# Patient Record
Sex: FEMALE | Race: WHITE | NOT HISPANIC OR LATINO | Employment: OTHER | URBAN - METROPOLITAN AREA
[De-identification: names, ages, dates, MRNs, and addresses within clinical notes are randomized per-mention and may not be internally consistent; named-entity substitution may affect disease eponyms.]

---

## 2017-06-23 ENCOUNTER — HISTORICAL (OUTPATIENT)
Dept: HEMATOLOGY/ONCOLOGY | Facility: CLINIC | Age: 67
End: 2017-06-23

## 2017-06-23 LAB
ABS NEUT (OLG): 2.57 X10(3)/MCL (ref 2.1–9.2)
ALBUMIN SERPL-MCNC: 4 GM/DL (ref 3.4–5)
ALBUMIN/GLOB SERPL: 1.5 {RATIO}
ALP SERPL-CCNC: 70 UNIT/L (ref 38–126)
ALT SERPL-CCNC: 18 UNIT/L (ref 12–78)
AST SERPL-CCNC: 9 UNIT/L (ref 15–37)
BASOPHILS # BLD AUTO: 0 X10(3)/MCL (ref 0–0.2)
BASOPHILS NFR BLD AUTO: 0.7 %
BILIRUB SERPL-MCNC: 0.7 MG/DL (ref 0.2–1)
BILIRUBIN DIRECT+TOT PNL SERPL-MCNC: 0.1 MG/DL (ref 0–0.2)
BILIRUBIN DIRECT+TOT PNL SERPL-MCNC: 0.6 MG/DL (ref 0–0.8)
BUN SERPL-MCNC: 15 MG/DL (ref 7–18)
CALCIUM SERPL-MCNC: 8.9 MG/DL (ref 8.5–10.1)
CEA SERPL-MCNC: 1 NG/ML (ref 0–3)
CHLORIDE SERPL-SCNC: 107 MMOL/L (ref 98–107)
CO2 SERPL-SCNC: 25 MMOL/L (ref 21–32)
CREAT SERPL-MCNC: 1.21 MG/DL (ref 0.55–1.02)
EOSINOPHIL # BLD AUTO: 0.4 X10(3)/MCL (ref 0–0.9)
EOSINOPHIL NFR BLD AUTO: 6.2 %
ERYTHROCYTE [DISTWIDTH] IN BLOOD BY AUTOMATED COUNT: 13 % (ref 11.5–17)
GLOBULIN SER-MCNC: 2.6 GM/DL (ref 2.4–3.5)
GLUCOSE SERPL-MCNC: 95 MG/DL (ref 74–106)
HCT VFR BLD AUTO: 44.6 % (ref 37–47)
HGB BLD-MCNC: 14.1 GM/DL (ref 12–16)
LYMPHOCYTES # BLD AUTO: 2.6 X10(3)/MCL (ref 0.6–4.6)
LYMPHOCYTES NFR BLD AUTO: 44.2 %
MCH RBC QN AUTO: 29.7 PG (ref 27–31)
MCHC RBC AUTO-ENTMCNC: 31.6 GM/DL (ref 33–36)
MCV RBC AUTO: 94.1 FL (ref 80–94)
MONOCYTES # BLD AUTO: 0.3 X10(3)/MCL (ref 0.1–1.3)
MONOCYTES NFR BLD AUTO: 5.6 %
NEUTROPHILS # BLD AUTO: 2.6 X10(3)/MCL (ref 2.1–9.2)
NEUTROPHILS NFR BLD AUTO: 43.3 %
PLATELET # BLD AUTO: 347 X10(3)/MCL (ref 130–400)
PMV BLD AUTO: 10.3 FL (ref 9.4–12.4)
POTASSIUM SERPL-SCNC: 4 MMOL/L (ref 3.5–5.1)
PROT SERPL-MCNC: 6.6 GM/DL (ref 6.4–8.2)
RBC # BLD AUTO: 4.74 X10(6)/MCL (ref 4.2–5.4)
SODIUM SERPL-SCNC: 142 MMOL/L (ref 136–145)
WBC # SPEC AUTO: 5.9 X10(3)/MCL (ref 4.5–11.5)

## 2017-06-26 ENCOUNTER — HISTORICAL (OUTPATIENT)
Dept: ANESTHESIOLOGY | Facility: HOSPITAL | Age: 67
End: 2017-06-26

## 2017-12-21 ENCOUNTER — HISTORICAL (OUTPATIENT)
Dept: HEMATOLOGY/ONCOLOGY | Facility: CLINIC | Age: 67
End: 2017-12-21

## 2017-12-21 LAB
ABS NEUT (OLG): 2.5 X10(3)/MCL (ref 2.1–9.2)
ALBUMIN SERPL-MCNC: 3.9 GM/DL (ref 3.4–5)
ALBUMIN/GLOB SERPL: 1.3 {RATIO}
ALP SERPL-CCNC: 71 UNIT/L (ref 38–126)
ALT SERPL-CCNC: 17 UNIT/L (ref 12–78)
AST SERPL-CCNC: 10 UNIT/L (ref 15–37)
BASOPHILS # BLD AUTO: 0 X10(3)/MCL (ref 0–0.2)
BASOPHILS NFR BLD AUTO: 0.9 %
BILIRUB SERPL-MCNC: 0.6 MG/DL (ref 0.2–1)
BILIRUBIN DIRECT+TOT PNL SERPL-MCNC: 0.1 MG/DL (ref 0–0.2)
BILIRUBIN DIRECT+TOT PNL SERPL-MCNC: 0.5 MG/DL (ref 0–0.8)
BUN SERPL-MCNC: 18 MG/DL (ref 7–18)
CALCIUM SERPL-MCNC: 9.8 MG/DL (ref 8.5–10.1)
CEA SERPL-MCNC: 1 NG/ML (ref 0–3)
CHLORIDE SERPL-SCNC: 105 MMOL/L (ref 98–107)
CO2 SERPL-SCNC: 24 MMOL/L (ref 21–32)
CREAT SERPL-MCNC: 1.08 MG/DL (ref 0.55–1.02)
EOSINOPHIL # BLD AUTO: 0.2 X10(3)/MCL (ref 0–0.9)
EOSINOPHIL NFR BLD AUTO: 3.5 %
ERYTHROCYTE [DISTWIDTH] IN BLOOD BY AUTOMATED COUNT: 12.8 % (ref 11.5–17)
GLOBULIN SER-MCNC: 3.1 GM/DL (ref 2.4–3.5)
GLUCOSE SERPL-MCNC: 62 MG/DL (ref 74–106)
HCT VFR BLD AUTO: 43.1 % (ref 37–47)
HGB BLD-MCNC: 13.8 GM/DL (ref 12–16)
LYMPHOCYTES # BLD AUTO: 2.5 X10(3)/MCL (ref 0.6–4.6)
LYMPHOCYTES NFR BLD AUTO: 44.1 %
MCH RBC QN AUTO: 30.2 PG (ref 27–31)
MCHC RBC AUTO-ENTMCNC: 32 GM/DL (ref 33–36)
MCV RBC AUTO: 94.3 FL (ref 80–94)
MONOCYTES # BLD AUTO: 0.4 X10(3)/MCL (ref 0.1–1.3)
MONOCYTES NFR BLD AUTO: 7.4 %
NEUTROPHILS # BLD AUTO: 2.5 X10(3)/MCL (ref 2.1–9.2)
NEUTROPHILS NFR BLD AUTO: 44.1 %
PLATELET # BLD AUTO: 378 X10(3)/MCL (ref 130–400)
PMV BLD AUTO: 9.9 FL (ref 9.4–12.4)
POTASSIUM SERPL-SCNC: 4.3 MMOL/L (ref 3.5–5.1)
PROT SERPL-MCNC: 7 GM/DL (ref 6.4–8.2)
RBC # BLD AUTO: 4.57 X10(6)/MCL (ref 4.2–5.4)
SODIUM SERPL-SCNC: 140 MMOL/L (ref 136–145)
WBC # SPEC AUTO: 5.7 X10(3)/MCL (ref 4.5–11.5)

## 2018-01-12 ENCOUNTER — HISTORICAL (OUTPATIENT)
Dept: RADIOLOGY | Facility: HOSPITAL | Age: 68
End: 2018-01-12

## 2018-12-14 ENCOUNTER — HISTORICAL (OUTPATIENT)
Dept: HEMATOLOGY/ONCOLOGY | Facility: CLINIC | Age: 68
End: 2018-12-14

## 2018-12-14 LAB
ABS NEUT (OLG): 2.23 X10(3)/MCL (ref 2.1–9.2)
ALBUMIN SERPL-MCNC: 4 GM/DL (ref 3.4–5)
ALBUMIN/GLOB SERPL: 1.4 {RATIO}
ALP SERPL-CCNC: 72 UNIT/L (ref 38–126)
ALT SERPL-CCNC: 16 UNIT/L (ref 12–78)
AST SERPL-CCNC: 7 UNIT/L (ref 15–37)
BASOPHILS # BLD AUTO: 0 X10(3)/MCL (ref 0–0.2)
BASOPHILS NFR BLD AUTO: 0.6 %
BILIRUB SERPL-MCNC: 0.7 MG/DL (ref 0.2–1)
BILIRUBIN DIRECT+TOT PNL SERPL-MCNC: 0.1 MG/DL (ref 0–0.2)
BILIRUBIN DIRECT+TOT PNL SERPL-MCNC: 0.6 MG/DL (ref 0–0.8)
BUN SERPL-MCNC: 18 MG/DL (ref 7–18)
CALCIUM SERPL-MCNC: 9 MG/DL (ref 8.5–10.1)
CEA SERPL-MCNC: 0.7 NG/ML (ref 0–3)
CHLORIDE SERPL-SCNC: 109 MMOL/L (ref 98–107)
CO2 SERPL-SCNC: 27 MMOL/L (ref 21–32)
CREAT SERPL-MCNC: 1.21 MG/DL (ref 0.55–1.02)
EOSINOPHIL # BLD AUTO: 0.4 X10(3)/MCL (ref 0–0.9)
EOSINOPHIL NFR BLD AUTO: 7.2 %
ERYTHROCYTE [DISTWIDTH] IN BLOOD BY AUTOMATED COUNT: 12 % (ref 11.5–17)
GLOBULIN SER-MCNC: 2.9 GM/DL (ref 2.4–3.5)
GLUCOSE SERPL-MCNC: 78 MG/DL (ref 74–106)
HCT VFR BLD AUTO: 45.9 % (ref 37–47)
HGB BLD-MCNC: 14.4 GM/DL (ref 12–16)
LYMPHOCYTES # BLD AUTO: 2.1 X10(3)/MCL (ref 0.6–4.6)
LYMPHOCYTES NFR BLD AUTO: 41.8 %
MCH RBC QN AUTO: 29.8 PG (ref 27–31)
MCHC RBC AUTO-ENTMCNC: 31.4 GM/DL (ref 33–36)
MCV RBC AUTO: 95 FL (ref 80–94)
MONOCYTES # BLD AUTO: 0.3 X10(3)/MCL (ref 0.1–1.3)
MONOCYTES NFR BLD AUTO: 5.8 %
NEUTROPHILS # BLD AUTO: 2.2 X10(3)/MCL (ref 2.1–9.2)
NEUTROPHILS NFR BLD AUTO: 44.4 %
PLATELET # BLD AUTO: 383 X10(3)/MCL (ref 130–400)
PMV BLD AUTO: 10.3 FL (ref 9.4–12.4)
POTASSIUM SERPL-SCNC: 4.7 MMOL/L (ref 3.5–5.1)
PROT SERPL-MCNC: 6.9 GM/DL (ref 6.4–8.2)
RBC # BLD AUTO: 4.83 X10(6)/MCL (ref 4.2–5.4)
SODIUM SERPL-SCNC: 143 MMOL/L (ref 136–145)
WBC # SPEC AUTO: 5 X10(3)/MCL (ref 4.5–11.5)

## 2019-11-15 ENCOUNTER — HISTORICAL (OUTPATIENT)
Dept: HEMATOLOGY/ONCOLOGY | Facility: CLINIC | Age: 69
End: 2019-11-15

## 2019-11-15 LAB
ABS NEUT (OLG): 2.1 X10(3)/MCL (ref 2.1–9.2)
ALBUMIN SERPL-MCNC: 3.9 GM/DL (ref 3.4–5)
ALBUMIN/GLOB SERPL: 1.4 {RATIO}
ALP SERPL-CCNC: 70 UNIT/L (ref 38–126)
ALT SERPL-CCNC: 26 UNIT/L (ref 12–78)
AST SERPL-CCNC: 13 UNIT/L (ref 15–37)
BASOPHILS # BLD AUTO: 0 X10(3)/MCL (ref 0–0.2)
BASOPHILS NFR BLD AUTO: 1 %
BILIRUB SERPL-MCNC: 0.7 MG/DL (ref 0.2–1)
BILIRUBIN DIRECT+TOT PNL SERPL-MCNC: 0.1 MG/DL (ref 0–0.2)
BILIRUBIN DIRECT+TOT PNL SERPL-MCNC: 0.6 MG/DL (ref 0–0.8)
BUN SERPL-MCNC: 17 MG/DL (ref 7–18)
CALCIUM SERPL-MCNC: 9.2 MG/DL (ref 8.5–10.1)
CEA SERPL-MCNC: 0.7 NG/ML (ref 0–3)
CHLORIDE SERPL-SCNC: 108 MMOL/L (ref 98–107)
CO2 SERPL-SCNC: 28 MMOL/L (ref 21–32)
CREAT SERPL-MCNC: 1.14 MG/DL (ref 0.55–1.02)
EOSINOPHIL # BLD AUTO: 0.2 X10(3)/MCL (ref 0–0.9)
EOSINOPHIL NFR BLD AUTO: 3.7 %
ERYTHROCYTE [DISTWIDTH] IN BLOOD BY AUTOMATED COUNT: 12.1 % (ref 11.5–17)
GLOBULIN SER-MCNC: 2.7 GM/DL (ref 2.4–3.5)
GLUCOSE SERPL-MCNC: 63 MG/DL (ref 74–106)
HCT VFR BLD AUTO: 45.6 % (ref 37–47)
HGB BLD-MCNC: 14.1 GM/DL (ref 12–16)
LYMPHOCYTES # BLD AUTO: 2.4 X10(3)/MCL (ref 0.6–4.6)
LYMPHOCYTES NFR BLD AUTO: 47.7 %
MCH RBC QN AUTO: 29.9 PG (ref 27–31)
MCHC RBC AUTO-ENTMCNC: 30.9 GM/DL (ref 33–36)
MCV RBC AUTO: 96.6 FL (ref 80–94)
MONOCYTES # BLD AUTO: 0.3 X10(3)/MCL (ref 0.1–1.3)
MONOCYTES NFR BLD AUTO: 5.9 %
NEUTROPHILS # BLD AUTO: 2.1 X10(3)/MCL (ref 2.1–9.2)
NEUTROPHILS NFR BLD AUTO: 41.5 %
PLATELET # BLD AUTO: 400 X10(3)/MCL (ref 130–400)
PMV BLD AUTO: 9.9 FL (ref 9.4–12.4)
POTASSIUM SERPL-SCNC: 4.2 MMOL/L (ref 3.5–5.1)
PROT SERPL-MCNC: 6.6 GM/DL (ref 6.4–8.2)
RBC # BLD AUTO: 4.72 X10(6)/MCL (ref 4.2–5.4)
SODIUM SERPL-SCNC: 140 MMOL/L (ref 136–145)
WBC # SPEC AUTO: 5.1 X10(3)/MCL (ref 4.5–11.5)

## 2019-11-20 ENCOUNTER — HISTORICAL (OUTPATIENT)
Dept: INFUSION THERAPY | Facility: HOSPITAL | Age: 69
End: 2019-11-20

## 2020-11-13 ENCOUNTER — HISTORICAL (OUTPATIENT)
Dept: HEMATOLOGY/ONCOLOGY | Facility: CLINIC | Age: 70
End: 2020-11-13

## 2020-11-13 LAB
ABS NEUT (OLG): 2.31 X10(3)/MCL (ref 2.1–9.2)
ALBUMIN SERPL-MCNC: 4.2 GM/DL (ref 3.4–4.8)
ALBUMIN/GLOB SERPL: 1.6 RATIO (ref 1.1–2)
ALP SERPL-CCNC: 55 UNIT/L (ref 40–150)
ALT SERPL-CCNC: 11 UNIT/L (ref 0–55)
AST SERPL-CCNC: 11 UNIT/L (ref 5–34)
BASOPHILS # BLD AUTO: 0 X10(3)/MCL (ref 0–0.2)
BASOPHILS NFR BLD AUTO: 1 %
BILIRUB SERPL-MCNC: 0.6 MG/DL
BILIRUBIN DIRECT+TOT PNL SERPL-MCNC: 0.2 MG/DL (ref 0–0.5)
BILIRUBIN DIRECT+TOT PNL SERPL-MCNC: 0.4 MG/DL (ref 0–0.8)
BUN SERPL-MCNC: 13.6 MG/DL (ref 9.8–20.1)
CALCIUM SERPL-MCNC: 9.4 MG/DL (ref 8.4–10.2)
CEA SERPL-MCNC: <1.73 NG/ML (ref 0–3)
CHLORIDE SERPL-SCNC: 106 MMOL/L (ref 98–107)
CO2 SERPL-SCNC: 30 MMOL/L (ref 23–31)
CREAT SERPL-MCNC: 1.05 MG/DL (ref 0.55–1.02)
EOSINOPHIL # BLD AUTO: 0.2 X10(3)/MCL (ref 0–0.9)
EOSINOPHIL NFR BLD AUTO: 3.5 %
ERYTHROCYTE [DISTWIDTH] IN BLOOD BY AUTOMATED COUNT: 11.7 % (ref 11.5–17)
GLOBULIN SER-MCNC: 2.6 GM/DL (ref 2.4–3.5)
GLUCOSE SERPL-MCNC: 81 MG/DL (ref 82–115)
HCT VFR BLD AUTO: 43.1 % (ref 37–47)
HGB BLD-MCNC: 13.7 GM/DL (ref 12–16)
LYMPHOCYTES # BLD AUTO: 2.3 X10(3)/MCL (ref 0.6–4.6)
LYMPHOCYTES NFR BLD AUTO: 44.2 %
MCH RBC QN AUTO: 31.1 PG (ref 27–31)
MCHC RBC AUTO-ENTMCNC: 31.8 GM/DL (ref 33–36)
MCV RBC AUTO: 98 FL (ref 80–94)
MONOCYTES # BLD AUTO: 0.3 X10(3)/MCL (ref 0.1–1.3)
MONOCYTES NFR BLD AUTO: 6.4 %
NEUTROPHILS # BLD AUTO: 2.3 X10(3)/MCL (ref 2.1–9.2)
NEUTROPHILS NFR BLD AUTO: 44.7 %
PLATELET # BLD AUTO: 379 X10(3)/MCL (ref 130–400)
PMV BLD AUTO: 9.6 FL (ref 9.4–12.4)
POTASSIUM SERPL-SCNC: 5.3 MMOL/L (ref 3.5–5.1)
PROT SERPL-MCNC: 6.8 GM/DL (ref 5.8–7.6)
RBC # BLD AUTO: 4.4 X10(6)/MCL (ref 4.2–5.4)
SODIUM SERPL-SCNC: 145 MMOL/L (ref 136–145)
WBC # SPEC AUTO: 5.2 X10(3)/MCL (ref 4.5–11.5)

## 2021-11-04 ENCOUNTER — HISTORICAL (OUTPATIENT)
Dept: HEMATOLOGY/ONCOLOGY | Facility: CLINIC | Age: 71
End: 2021-11-04

## 2021-11-04 LAB
ABS NEUT (OLG): 2.31 X10(3)/MCL (ref 2.1–9.2)
ALBUMIN SERPL-MCNC: 4.2 GM/DL (ref 3.4–4.8)
ALBUMIN/GLOB SERPL: 1.8 RATIO (ref 1.1–2)
ALP SERPL-CCNC: 60 UNIT/L (ref 40–150)
ALT SERPL-CCNC: 12 UNIT/L (ref 0–55)
AST SERPL-CCNC: 12 UNIT/L (ref 5–34)
BASOPHILS # BLD AUTO: 0 X10(3)/MCL (ref 0–0.2)
BASOPHILS NFR BLD AUTO: 0.9 %
BILIRUB SERPL-MCNC: 0.5 MG/DL
BILIRUBIN DIRECT+TOT PNL SERPL-MCNC: 0.2 MG/DL (ref 0–0.5)
BILIRUBIN DIRECT+TOT PNL SERPL-MCNC: 0.3 MG/DL (ref 0–0.8)
BUN SERPL-MCNC: 14.1 MG/DL (ref 9.8–20.1)
CALCIUM SERPL-MCNC: 9.9 MG/DL (ref 8.7–10.5)
CEA SERPL-MCNC: <1.73 NG/ML (ref 0–3)
CHLORIDE SERPL-SCNC: 106 MMOL/L (ref 98–107)
CO2 SERPL-SCNC: 24 MMOL/L (ref 23–31)
CREAT SERPL-MCNC: 0.91 MG/DL (ref 0.55–1.02)
EOSINOPHIL # BLD AUTO: 0.4 X10(3)/MCL (ref 0–0.9)
EOSINOPHIL NFR BLD AUTO: 6.9 %
ERYTHROCYTE [DISTWIDTH] IN BLOOD BY AUTOMATED COUNT: 12.3 % (ref 11.5–17)
GLOBULIN SER-MCNC: 2.4 GM/DL (ref 2.4–3.5)
GLUCOSE SERPL-MCNC: 92 MG/DL (ref 82–115)
HCT VFR BLD AUTO: 44.8 % (ref 37–47)
HGB BLD-MCNC: 14.1 GM/DL (ref 12–16)
LYMPHOCYTES # BLD AUTO: 2.2 X10(3)/MCL (ref 0.6–4.6)
LYMPHOCYTES NFR BLD AUTO: 41.7 %
MCH RBC QN AUTO: 30.4 PG (ref 27–31)
MCHC RBC AUTO-ENTMCNC: 31.5 GM/DL (ref 33–36)
MCV RBC AUTO: 96.6 FL (ref 80–94)
MONOCYTES # BLD AUTO: 0.4 X10(3)/MCL (ref 0.1–1.3)
MONOCYTES NFR BLD AUTO: 7.1 %
NEUTROPHILS # BLD AUTO: 2.3 X10(3)/MCL (ref 2.1–9.2)
NEUTROPHILS NFR BLD AUTO: 43.2 %
PLATELET # BLD AUTO: 377 X10(3)/MCL (ref 130–400)
PMV BLD AUTO: 10 FL (ref 9.4–12.4)
POTASSIUM SERPL-SCNC: 4.1 MMOL/L (ref 3.5–5.1)
PROT SERPL-MCNC: 6.6 GM/DL (ref 5.8–7.6)
RBC # BLD AUTO: 4.64 X10(6)/MCL (ref 4.2–5.4)
SODIUM SERPL-SCNC: 142 MMOL/L (ref 136–145)
WBC # SPEC AUTO: 5.4 X10(3)/MCL (ref 4.5–11.5)

## 2022-01-24 ENCOUNTER — HISTORICAL (OUTPATIENT)
Dept: RADIOLOGY | Facility: HOSPITAL | Age: 72
End: 2022-01-24

## 2022-04-30 NOTE — PROGRESS NOTES
Patient:   Elida Pate            MRN: [id number]            FIN: [id number]               Age:   69 years     Sex:  Female     :  1950   Associated Diagnoses:   None   Author:   Clement ELLSWORTH, Shahana KERR      Visit Information   Diagnosis: Stage IIA right breast cancer (T2 N0 M0), 2.5 cm, grade III, ER/MA negative, HER-2 +,                          high risk Oncotype score of 75        Chronic back pain    Current Treatment: Observation  Treatment History: [facility] protocol BIG-4-11: TCH + study drug x 6 ---> maintenance Herceptin + study drug x 1 yr    Clinical History:  Patient initially presented  with a palpable mass in the right breast. History of multiple previous breast biopsies which were negative. The mass persisted and a subsequent mammogram was abnormal. Biopsy was positive for infiltrating ductal carcinoma. She underwent right modified radical mastectomy 12. Final pathology showed a 2.5 cm grade 3 ductal carcinoma with clear margins and 10 negative lymph nodes. There was no evidence of lymphovascular invasion. Estrogen receptors +2%, progesterone receptors <1% and HER-2/kenyetta was overexpressed by fish. Ki-67 was high at 64%. Oncotype testing showed a high risk for recurrence score of 75. Estrogen and progesterone receptors were negative on Oncotype testing. Adjuvant chemotherapy was recommended with a trastuzamab-based regimen. She was initially evaluated by Dr. Christensen and enrolled on protocol to receive carboplatin, Taxotere and Herceptin +/- Perjeta. I assumed her care just prior to her sixth cycle of chemotherapy at her request. She had significant difficulty tolerating her adjuvant therapy. She had significant nausea, myalgias, fatigue, diarrhea and lower extremity edema. Taxotere was discontinued and she was changed to Taxol beginning with her third cycle which was tolerated marginally better. She developed grade 2 neuropathy associated with Taxol. She completed 1 year of  maintenance therapy with Herceptin in combination with the study drug 9/27/13. Echocardiogram at the completion of therapy 9/20/13 showed preserved left ventricular systolic function with an ejection fraction of 60%.    She presented to the hospital with a tender right inguinal mass 10/7/13. Physical exam was compatible with a reducible, partially strangulated inguinal hernia. CT scan of the abdomen and pelvis showed a right inguinal Baldwin's hernia with ischemic change of the hernia sac and intra-abdominal small bowel. She underwent right hernia repair with mesh 10/10/13. She was discharged home uneventfully. She underwent prophylactic left mastectomy and placement of bilateral tissue expanders 11/25/13. Left breast showed no significant pathology. She had to undergo additional surgery 12/27/13 due to recurrence of her right inguinal hernia. Her Mediport was removed.    Surveillance CT scans of the chest and abdomen 3/11/14 showed a 6 mm nonspecific ground-glass nodule in the left lower lobe there was not clearly seen on the previous CT PET scan. There were no findings to indicate metastatic disease. Whole body bone scan was negative. Followup CT of the chest 6/3/14 showed resolution of the left lower lobe nodule and no new findings.  She completed her reconstruction therapy 12/29/14 with removal of the tissue expanders and placement of bilateral implants.  No additional surgery or nipple reconstruction is planned.  She reports no changes on her self breast examination.  Laboratory testing is unremarkable including serum tumor markers.  Follow-up echocardiogram 2/7/15 showed normal left ventricular function with an ejection fraction of 60%.  She has done well without evidence of disease recurrence.    She was seen 1/14/16 for routine breast cancer surveillance.  She had no evidence of disease recurrence and her laboratory was unremarkable.  She reported acute back pain involving the thoracic spine worse when flat  and not improved with Tylenol or Motrin.  She was referred for an x-ray of the thoracic spine 2/5/16, which revealed moderate diffuse osteopenia, mild thoracic kyphosis, and mild degenerative change of the thoracic spine.  There were no osseous lytic or blastic lesions.  Due to persistent symptoms, she underwent an MRI of the thoracic spine 4/18/16.  There is a small altered signal at the left pedicle of T6 of undetermined significance with no corresponding lytic or sclerotic lesions.  There was also abnormal signal at T9 left vertebral body felt consistent with an atypical hemangioma.  She had other degenerative changes and disc bulge at T9-10 causing indentation of the thecal sac.      Chief Complaint   No problems      Interval History   Patient is here today by herself for breast cancer surveillance appointment.  She feels well.  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  She is now over 7 years out from her diagnosis and surgery.  She has done well with no evidence of disease recurrence.  She has no complaints today.  She has ongoing peripheral neuropathy for which she takes gabapentin daily.  Her symptoms remain stable.  She denies any changes to her self breast exam.  Laboratory performed for this visit including LFTs and tumor markers were entirely normal and reviewed today with the patient.  She is requesting a flu vaccine.      Review of Systems   Constitutional:  No fever, No chills, No sweats, No fatigue.    Eye:  No icterus, No double vision, No visual disturbances.    Ear/Nose/Mouth/Throat:  No nasal congestion, No nasal discharge, No sore throat.    Respiratory:  No shortness of breath, No cough, No sputum production, No hemoptysis, No wheezing.    Cardiovascular:  No chest pain, No palpitations, No tachycardia.    Breast:  No changes on self breast examination.    Gastrointestinal:  No nausea, No vomiting, No diarrhea, No constipation, No heartburn, No abdominal pain.    Genitourinary:   No dysuria, No hematuria, No change in urine stream.    Hematology/Lymphatics:  Bruising tendency, No bleeding tendency, No swollen lymph glands.    Musculoskeletal:  Joint pain, Chronic back pain - improved, No claudication.    Integumentary:  No rash, No dryness, No skin lesion.    Neurologic:  Alert and oriented X4, Grade 2 sensory neuropathy of feet > hands, No abnormal balance, No confusion.    Psychiatric:  Poor short-term memory, No anxiety, No depression.       Health Status   Allergies:    Allergic Reactions (Selected)  Severity Not Documented  Ambien- No reactions were documented.  Morphine- No reactions were documented.  Neosporin Ointment- No reactions were documented.,    Allergies (3) Active Reaction  Ambien None Documented  morphine None Documented  Neosporin Ointment None Documented     Current medications:  (Selected)   Outpatient Medications  Future  FLU VACCINE for IM inj. (Pt. > 36 mo): 0.5 mL, form: Injection, IM, Once, *Est. first dose 11/20/19 11:33:00 CST, *Est. stop date 11/20/19 11:33:00 CST, NOW, Future Order, Waste Code [facility]  Prescriptions  Prescribed  Neurontin 300 mg oral capsule: 300 mg = 1 cap(s), Oral, BID, # 60 cap(s), 6 Refill(s), Pharmacy: [id number]  Valtrex 500 mg oral tablet: 500 mg = 1 tab(s), Oral, BID, PRN PRN Fever blisters, # 30 tab(s), 5 Refill(s), Pharmacy: [id number]      Histories   Past Medical History:    Active  Breast cancer (497136390)  High cholesterol (177536009)  Resolved  POLY - Total abdominal hysterectomy (940892809):  Resolved.  Hypercholesterolemia (54204083):  Resolved.  Ocular shinges (9157181):  Resolved.   Family History:    Primary malignant neoplasm of breast  Other  Comments:  10/21/2013 17:46 ABBIT - Anitra Franco  Paternal aunt had lobular breast cancer and paternal great aunt had breast cancer  Primary malignant neoplasm of lung  Mother  Mother  Squamous cell carcinoma of leg...  Father     Procedure history:    Hernia  Repair Inguinal Laparoscopic (Right) on 10/10/2013 at 63 Years.  Comments:  10/10/2013 13:01 ELIS Zimmerman RN , Eladia BURGOS  auto-populated from documented surgical case  mediport placement.  Right breast masectomy.  hernia repair.  Hysterectomy at age 40.  Bilateral breast biopsies.  Right mastectomy in 8/12.  Tissue expanders placed right breast.   Social History        Social & Psychosocial Habits    Alcohol  10/05/2012 Risk Assessment: Denies Alcohol Use    04/06/2016  Use: Never    Substance Use  12/09/2012 Risk Assessment: Denies Substance Abuse    12/28/2016  Use: Never    Tobacco  10/05/2012 Risk Assessment: Denies Tobacco Use    04/06/2016  Use: Never smoker    12/20/2018  Use: Never smoker    Patient Wants Consult For Cessation Counseling N/A  .     Tobacco: Nonsmoker, no significant alcohol use; Works as an , lives in [place].        Physical Examination   Vital Signs   11/20/2019 10:44 CST     Temperature Oral          36.8 DegC                             Temperature Oral (calculated)             98.24 DegF                             Peripheral Pulse Rate     75 bpm                             Systolic Blood Pressure   128 mmHg                             Diastolic Blood Pressure  82 mmHg     Measurements from flowsheet : Measurements   11/20/2019 10:44 CST     Weight Dosing             86.7 kg                             Weight Measured           86.7 kg                             Weight Measured and Calculated in Lbs     191.14 lb                             Height/Length Dosing      170.18 cm                             Height/Length Measured    170.18 cm                             BSA Measured              2.02 m2                             Body Mass Index Measured  29.94 kg/m2     General:  Alert and oriented, Well-developed white female in no acute distress.    Eye:  Pupils are equal, round and reactive to light, Extraocular movements are intact, Normal conjunctiva,  Vision unchanged, Sclera nonicteric.    HENT:  Normocephalic, Oral mucosa is moist, No pharyngeal erythema.    Neck:  Supple, Non-tender, No lymphadenopathy.    Respiratory:  Lungs are clear to auscultation, Respirations are non-labored, Breath sounds are equal.    Cardiovascular:  Normal rate, Regular rhythm, No murmur, Mediport removal scar left upper chest.    Breast:  Bilateral mastectomies with implant reconstruction.  No palpable masses, skin changes or axillary nodes bilaterally.    Gastrointestinal:  Soft, Non-tender, Non-distended, Normal bowel sounds, No organomegaly, Well-healed right inguinal incision.    Lymphatics:  No lymphadenopathy neck, axilla, groin.    Musculoskeletal:  Normal range of motion, No tenderness, No lower extremity edema.    Integumentary:  Warm, Dry, No rash.    Neurologic:  Alert, Oriented, Normal motor function, Cranial Nerves II-XII are grossly intact, + Chronic sensory neuropathy.    ECOG Performance Scale: 1- Strenuous physical activity restricted; fully ambulatory and able to carry out light work.      Review / Management   Laboratory Results   Last 10 Days Lab Results : PowerNote Discrete Results   11/15/2019 11:10 CST     Sodium Lvl                140 mmol/L                             Potassium Lvl             4.2 mmol/L                             Chloride                  108 mmol/L  HI                             CO2                       28.0 mmol/L                             Calcium Lvl               9.2 mg/dL                             Glucose Lvl               63 mg/dL  LOW                             BUN                       17.0 mg/dL                             Creatinine                1.14 mg/dL  HI                             eGFR-AA                   >60 mL/min/1.73 m2  NA                             eGFR-CHMEA                  50 mL/min/1.73 m2  NA                             Bili Total                0.7 mg/dL                             Bili Direct                0.10 mg/dL                             Bili Indirect             0.60 mg/dL                             AST                       13 unit/L  LOW                             ALT                       26 unit/L                             Alk Phos                  70 unit/L                             Total Protein             6.6 gm/dL                             Albumin Lvl               3.90 gm/dL                             Globulin                  2.70 gm/dL                             A/G Ratio                 1.4  NA                             CEA                       0.7 ng/mL                             CA 27.29                  25.9 unit/mL    11/15/2019 11:08 CST     WBC                       5.1 x10(3)/mcL                             RBC                       4.72 x10(6)/mcL                             Hgb                       14.1 gm/dL                             Hct                       45.6 %                             Platelet                  400 x10(3)/mcL                             MCV                       96.6 fL  HI                             MCH                       29.9 pg                             MCHC                      30.9 gm/dL  LOW                             RDW                       12.1 %                             MPV                       9.9 fL                             Abs Neut                  2.10 x10(3)/mcL                             NEUT%                     41.5 %  NA                             NEUT#                     2.1 x10(3)/mcL                             LYMPH%                    47.7 %  NA                             LYMPH#                    2.4 x10(3)/mcL                             MONO%                     5.9 %  NA                             MONO#                     0.3 x10(3)/mcL                             EOS%                      3.7 %  NA                             EOS#                      0.2 x10(3)/mcL                              BASO%                     1.0 %  NA                             BASO#                     0.0 x10(3)/mcL           Impression and Plan   IMPRESSION:  Stage IIA breast  cancer - MOLLY   Chronic neuropathy involving lower extremities    PLAN:  Patient has no evidence of disease recurrence.  She will return in 1 year for ongoing breast cancer surveillance.  Flu vaccine today.    MU ELDRIDGE, FNP-C    Other Physicians   Dr. ALVARO Lyles

## 2022-07-18 DIAGNOSIS — K40.90 UNILATERAL INGUINAL HERNIA WITHOUT OBSTRUCTION OR GANGRENE: Primary | ICD-10-CM

## 2022-07-20 ENCOUNTER — HOSPITAL ENCOUNTER (OUTPATIENT)
Dept: RADIOLOGY | Facility: HOSPITAL | Age: 72
Discharge: HOME | End: 2022-07-20
Attending: SURGERY
Payer: MEDICARE

## 2022-07-20 DIAGNOSIS — K40.90 UNILATERAL INGUINAL HERNIA WITHOUT OBSTRUCTION OR GANGRENE: ICD-10-CM

## 2022-07-20 LAB
CREAT SERPL-MCNC: 1.2 MG/DL (ref 0.5–1.4)
SAMPLE: NORMAL

## 2022-07-20 PROCEDURE — 25500020 PHARM REV CODE 255: Performed by: SURGERY

## 2022-07-20 PROCEDURE — 72193 CT PELVIS W/DYE: CPT | Mod: TC

## 2022-07-20 RX ADMIN — IOPAMIDOL 100 ML: 755 INJECTION, SOLUTION INTRAVENOUS at 09:07

## 2022-10-27 DIAGNOSIS — C50.411 MALIGNANT NEOPLASM OF UPPER-OUTER QUADRANT OF RIGHT FEMALE BREAST, UNSPECIFIED ESTROGEN RECEPTOR STATUS: Primary | ICD-10-CM

## 2022-10-28 ENCOUNTER — LAB VISIT (OUTPATIENT)
Dept: LAB | Facility: HOSPITAL | Age: 72
End: 2022-10-28
Attending: PHYSICIAN ASSISTANT
Payer: MEDICARE

## 2022-10-28 DIAGNOSIS — R79.1 ABNORMAL COAGULATION PROFILE: ICD-10-CM

## 2022-10-28 DIAGNOSIS — C50.411 MALIGNANT NEOPLASM OF UPPER-OUTER QUADRANT OF RIGHT FEMALE BREAST, UNSPECIFIED ESTROGEN RECEPTOR STATUS: ICD-10-CM

## 2022-10-28 DIAGNOSIS — M99.04 SOMATIC DYSFUNCTION OF SACRAL REGION: Primary | ICD-10-CM

## 2022-10-28 DIAGNOSIS — Z01.818 OTHER SPECIFIED PRE-OPERATIVE EXAMINATION: ICD-10-CM

## 2022-10-28 DIAGNOSIS — R94.5 ABNORMAL RESULTS OF LIVER FUNCTION STUDIES: ICD-10-CM

## 2022-10-28 LAB
ALBUMIN SERPL-MCNC: 4.4 GM/DL (ref 3.4–4.8)
ALBUMIN/GLOB SERPL: 1.5 RATIO (ref 1.1–2)
ALP SERPL-CCNC: 66 UNIT/L (ref 40–150)
ALT SERPL-CCNC: 18 UNIT/L (ref 0–55)
APTT PPP: 25.9 SECONDS (ref 23.2–33.7)
AST SERPL-CCNC: 17 UNIT/L (ref 5–34)
BASOPHILS # BLD AUTO: 0.06 X10(3)/MCL (ref 0–0.2)
BASOPHILS NFR BLD AUTO: 1.1 %
BILIRUBIN DIRECT+TOT PNL SERPL-MCNC: 0.6 MG/DL
BUN SERPL-MCNC: 25.4 MG/DL (ref 9.8–20.1)
CALCIUM SERPL-MCNC: 10 MG/DL (ref 8.4–10.2)
CEA SERPL-MCNC: <1.73 NG/ML (ref 0–3)
CHLORIDE SERPL-SCNC: 108 MMOL/L (ref 98–107)
CO2 SERPL-SCNC: 23 MMOL/L (ref 23–31)
CREAT SERPL-MCNC: 1.23 MG/DL (ref 0.55–1.02)
EOSINOPHIL # BLD AUTO: 0.15 X10(3)/MCL (ref 0–0.9)
EOSINOPHIL NFR BLD AUTO: 2.7 %
ERYTHROCYTE [DISTWIDTH] IN BLOOD BY AUTOMATED COUNT: 12.2 % (ref 11.5–17)
GFR SERPLBLD CREATININE-BSD FMLA CKD-EPI: 47 MLS/MIN/1.73/M2
GLOBULIN SER-MCNC: 2.9 GM/DL (ref 2.4–3.5)
GLUCOSE SERPL-MCNC: 87 MG/DL (ref 82–115)
HCT VFR BLD AUTO: 46.3 % (ref 37–47)
HGB BLD-MCNC: 14.3 GM/DL (ref 12–16)
IMM GRANULOCYTES # BLD AUTO: 0.01 X10(3)/MCL (ref 0–0.04)
IMM GRANULOCYTES NFR BLD AUTO: 0.2 %
INR BLD: 1.01 (ref 0–1.3)
LYMPHOCYTES # BLD AUTO: 2.65 X10(3)/MCL (ref 0.6–4.6)
LYMPHOCYTES NFR BLD AUTO: 47.6 %
MCH RBC QN AUTO: 29.4 PG (ref 27–31)
MCHC RBC AUTO-ENTMCNC: 30.9 MG/DL (ref 33–36)
MCV RBC AUTO: 95.1 FL (ref 80–94)
MONOCYTES # BLD AUTO: 0.36 X10(3)/MCL (ref 0.1–1.3)
MONOCYTES NFR BLD AUTO: 6.5 %
NEUTROPHILS # BLD AUTO: 2.3 X10(3)/MCL (ref 2.1–9.2)
NEUTROPHILS NFR BLD AUTO: 41.9 %
PLATELET # BLD AUTO: 453 X10(3)/MCL (ref 130–400)
PMV BLD AUTO: 10 FL (ref 7.4–10.4)
POTASSIUM SERPL-SCNC: 5.3 MMOL/L (ref 3.5–5.1)
PROT SERPL-MCNC: 7.3 GM/DL (ref 5.8–7.6)
PROTHROMBIN TIME: 10.2 SECONDS (ref 12.5–14.5)
RBC # BLD AUTO: 4.87 X10(6)/MCL (ref 4.2–5.4)
SODIUM SERPL-SCNC: 142 MMOL/L (ref 136–145)
WBC # SPEC AUTO: 5.6 X10(3)/MCL (ref 4.5–11.5)

## 2022-10-28 PROCEDURE — 86300 IMMUNOASSAY TUMOR CA 15-3: CPT

## 2022-10-28 PROCEDURE — 85610 PROTHROMBIN TIME: CPT

## 2022-10-28 PROCEDURE — 36415 COLL VENOUS BLD VENIPUNCTURE: CPT

## 2022-10-28 PROCEDURE — 82378 CARCINOEMBRYONIC ANTIGEN: CPT

## 2022-10-28 PROCEDURE — 85025 COMPLETE CBC W/AUTO DIFF WBC: CPT

## 2022-10-28 PROCEDURE — 85730 THROMBOPLASTIN TIME PARTIAL: CPT

## 2022-10-28 PROCEDURE — 80053 COMPREHEN METABOLIC PANEL: CPT

## 2022-10-31 LAB — CANCER AG27-29 SERPL-ACNC: 16.8 U/ML

## 2022-11-16 RX ORDER — AMLODIPINE BESYLATE 5 MG/1
5 TABLET ORAL DAILY
COMMUNITY
Start: 2022-10-24

## 2022-11-16 RX ORDER — GABAPENTIN 300 MG/1
CAPSULE ORAL
COMMUNITY
Start: 2022-09-01 | End: 2022-11-28 | Stop reason: SDUPTHER

## 2022-11-17 NOTE — PROGRESS NOTES
Subjective:       Patient ID: Elida Pate is a 72 y.o. female.    Chief Complaint:  I just had surgery for my back    Diagnosis: Stage IIA right breast cancer 8/12 (T2 N0 M0), 2.5 cm, grade III, ER/FL negative, HER-2 +,                          Oncotype RS 75                    S/p bilateral mastectomies                    + COVID-19 vaccinated    Treatment History  [facility] protocol BIG-4-11: TCH + study drug x 6 ---> maintenance Herceptin + study drug x 1 yr    Current Treatment: Observation    Clinical History:  Patient initially presented 2/12 with a palpable mass in the right breast. History of multiple previous breast biopsies which were negative. The mass persisted and a subsequent mammogram was abnormal. Biopsy was positive for infiltrating ductal carcinoma. She underwent right modified radical mastectomy 8/17/12. Final pathology showed a 2.5 cm grade 3 ductal carcinoma with clear margins and 10 negative lymph nodes. There was no evidence of lymphovascular invasion. Estrogen receptors +2%, progesterone receptors <1% and HER-2/kenyetta was overexpressed by FISH. Ki-67 was high at 64%. Oncotype recurrence score 75. Estrogen and progesterone receptors were negative on Oncotype testing. Adjuvant chemotherapy was recommended with a trastuzamab-based regimen. She was initially evaluated by Dr. Christensen and enrolled on protocol to receive carboplatin, Taxotere and Herceptin +/- Perjeta. I assumed her care just prior to her sixth cycle of chemotherapy at her request. She had significant difficulty tolerating her adjuvant therapy. She had significant nausea, myalgias, fatigue, diarrhea and lower extremity edema. Taxotere was discontinued and she was changed to Taxol beginning with her third cycle which was tolerated marginally better. She developed grade 2 neuropathy associated with Taxol. She completed 1 year of maintenance therapy with Herceptin in combination with the study drug 9/27/13. Echocardiogram at the  completion of therapy 9/20/13 showed preserved left ventricular systolic function with an ejection fraction of 60%.    She underwent resection of an incarcerated right inguinal hernia 10/10/13. She underwent prophylactic left mastectomy and placement of bilateral tissue expanders 11/25/13. Left breast showed no significant pathology. She had to undergo additional surgery 12/27/13 due to recurrence of her right inguinal hernia. Her Mediport was removed.  She underwent placement of bilateral breast implants 12/29/14.  Follow-up echocardiogram 2/7/15 showed normal left ventricular function with an ejection fraction of 60%.  She has done well without evidence of disease recurrence.    Interval History  She returns to the office today for an annual surveillance visit accompanied by her .  She is in a wheelchair after undergoing surgery for an S1 fusion 11/16/22.  She reports no other significant health issues.  She has no changes on her self examination following bilateral mastectomies and reconstruction.  Laboratory testing for her visit was unremarkable.    Review of Systems   Constitutional:  Positive for activity change. Negative for appetite change, fatigue and fever.   HENT:  Negative for mouth sores, sore throat and trouble swallowing.    Eyes: Negative.    Respiratory:  Negative for cough, chest tightness and shortness of breath.    Cardiovascular:  Negative for chest pain, palpitations and leg swelling.   Gastrointestinal:  Negative for abdominal distention, abdominal pain, blood in stool, change in bowel habit, constipation, diarrhea, nausea, vomiting and change in bowel habit.   Genitourinary:  Negative for dysuria, frequency and urgency.   Musculoskeletal:  Positive for arthralgias and back pain.   Integumentary:  Negative for rash and mole/lesion.   Neurological:  Positive for numbness (Feet > hands). Negative for dizziness, weakness and headaches.   Hematological:  Negative for adenopathy. Does not  "bruise/bleed easily.   Psychiatric/Behavioral: Negative.         PMHx:  HTN, athritis, neuropathy  PSHx:  Hysterectomy, right mastectomy and reconstruction, MediPort placement and removal, right inguinal hernia repair, S1 fusion  SH:  Lifetime nonsmoker, no significant alcohol use.  Lives in [place] with her , works as an  at .  FH:  Her mother had lung cancer, father had a metastatic squamous cell carcinoma of the leg.  A paternal aunt had breast cancer.    Objective:        /70 (BP Location: Left arm)   Pulse 68   Temp 98.6 °F (37 °C)   Ht 5' 7.01" (1.702 m)   Wt 87.6 kg (193 lb 2 oz)   BMI 30.24 kg/m²    Physical Exam  Constitutional:       Comments: Elderly, well-developed white female in a wheelchair in NAD   HENT:      Head: Normocephalic.      Mouth/Throat:      Mouth: Mucous membranes are moist.      Pharynx: Oropharynx is clear. No posterior oropharyngeal erythema.   Eyes:      Extraocular Movements: Extraocular movements intact.      Conjunctiva/sclera: Conjunctivae normal.      Pupils: Pupils are equal, round, and reactive to light.   Cardiovascular:      Rate and Rhythm: Normal rate and regular rhythm.      Heart sounds: No murmur heard.  Pulmonary:      Comments: Lungs clear to auscultation  Chest:      Comments: Bilateral mastectomies with implant reconstruction.  No suspicious masses, skin changes or axillary nodes bilaterally.  Abdominal:      General: Bowel sounds are normal. There is no distension.      Palpations: Abdomen is soft. There is no mass.      Tenderness: There is no abdominal tenderness.   Musculoskeletal:         General: No swelling or tenderness. Normal range of motion.      Cervical back: Neck supple. No tenderness.   Lymphadenopathy:      Cervical: No cervical adenopathy.      Comments: Well-healed right inguinal incision.   Skin:     General: Skin is warm and dry.      Findings: No rash.   Neurological:      Mental Status: She is " alert and oriented to person, place, and time. Mental status is at baseline.      Cranial Nerves: No cranial nerve deficit.      Sensory: Sensory deficit present.     ECOG SCORE    1 - Restricted in strenuous activity-ambulatory and able to carry out work of a light nature        LABORATORY  No results found for this or any previous visit (from the past 168 hour(s)).       Laboratory from 10/28/22 reviewed and unremarkable.    Assessment:   Stage IIA breast  cancer - MOLLY   Chronic neuropathy involving lower extremities - stable  S/p S1 fusion 11/16/22      Plan:   Patient has no clinical or laboratory findings suspicious for recurrence of her breast cancer.    She has no changes on her clinical exam.  RTC in 1 year for a follow-up visit and clinical exam.      MARCOS DUKE MD    Other Physicians  Dr. Paco Schmidt

## 2022-11-21 ENCOUNTER — OFFICE VISIT (OUTPATIENT)
Dept: HEMATOLOGY/ONCOLOGY | Facility: CLINIC | Age: 72
End: 2022-11-21
Payer: MEDICARE

## 2022-11-21 VITALS
WEIGHT: 193.13 LBS | SYSTOLIC BLOOD PRESSURE: 110 MMHG | HEIGHT: 67 IN | HEART RATE: 68 BPM | BODY MASS INDEX: 30.31 KG/M2 | TEMPERATURE: 99 F | DIASTOLIC BLOOD PRESSURE: 70 MMHG

## 2022-11-21 DIAGNOSIS — C50.411 MALIGNANT NEOPLASM OF UPPER-OUTER QUADRANT OF RIGHT BREAST IN FEMALE, ESTROGEN RECEPTOR NEGATIVE: Primary | ICD-10-CM

## 2022-11-21 DIAGNOSIS — Z17.1 MALIGNANT NEOPLASM OF UPPER-OUTER QUADRANT OF RIGHT BREAST IN FEMALE, ESTROGEN RECEPTOR NEGATIVE: Primary | ICD-10-CM

## 2022-11-21 PROCEDURE — 99999 PR PBB SHADOW E&M-EST. PATIENT-LVL III: ICD-10-PCS | Mod: PBBFAC,,, | Performed by: INTERNAL MEDICINE

## 2022-11-21 PROCEDURE — 99999 PR PBB SHADOW E&M-EST. PATIENT-LVL III: CPT | Mod: PBBFAC,,, | Performed by: INTERNAL MEDICINE

## 2022-11-21 PROCEDURE — 99214 OFFICE O/P EST MOD 30 MIN: CPT | Mod: S$PBB,,, | Performed by: INTERNAL MEDICINE

## 2022-11-21 PROCEDURE — 99213 OFFICE O/P EST LOW 20 MIN: CPT | Mod: PBBFAC | Performed by: INTERNAL MEDICINE

## 2022-11-21 PROCEDURE — 99214 PR OFFICE/OUTPT VISIT, EST, LEVL IV, 30-39 MIN: ICD-10-PCS | Mod: S$PBB,,, | Performed by: INTERNAL MEDICINE

## 2022-11-21 RX ORDER — TRAMADOL HYDROCHLORIDE 50 MG/1
50 TABLET ORAL EVERY 4 HOURS PRN
COMMUNITY
Start: 2022-11-16

## 2022-11-21 RX ORDER — CEPHALEXIN 500 MG/1
500 CAPSULE ORAL 4 TIMES DAILY
COMMUNITY
Start: 2022-11-16 | End: 2023-11-21 | Stop reason: ALTCHOICE

## 2022-11-28 DIAGNOSIS — G62.0 PERIPHERAL NEUROPATHY DUE TO CHEMOTHERAPY: Primary | ICD-10-CM

## 2022-11-28 DIAGNOSIS — T45.1X5A PERIPHERAL NEUROPATHY DUE TO CHEMOTHERAPY: Primary | ICD-10-CM

## 2022-11-28 RX ORDER — GABAPENTIN 300 MG/1
300 CAPSULE ORAL 2 TIMES DAILY
Qty: 60 CAPSULE | Refills: 5 | Status: SHIPPED | OUTPATIENT
Start: 2022-11-28 | End: 2023-05-30 | Stop reason: SDUPTHER

## 2023-01-03 ENCOUNTER — LAB VISIT (OUTPATIENT)
Dept: LAB | Facility: HOSPITAL | Age: 73
End: 2023-01-03
Attending: FAMILY MEDICINE
Payer: MEDICARE

## 2023-01-03 DIAGNOSIS — I10 ESSENTIAL HYPERTENSION, MALIGNANT: Primary | ICD-10-CM

## 2023-01-03 DIAGNOSIS — E05.90 PRETIBIAL MYXEDEMA: ICD-10-CM

## 2023-01-03 DIAGNOSIS — Z79.899 ENCOUNTER FOR LONG-TERM (CURRENT) USE OF OTHER MEDICATIONS: ICD-10-CM

## 2023-01-03 LAB
ALBUMIN SERPL-MCNC: 4.2 G/DL (ref 3.4–4.8)
ALBUMIN/GLOB SERPL: 1.7 RATIO (ref 1.1–2)
ALP SERPL-CCNC: 72 UNIT/L (ref 40–150)
ALT SERPL-CCNC: 54 UNIT/L (ref 0–55)
APPEARANCE UR: CLEAR
AST SERPL-CCNC: 29 UNIT/L (ref 5–34)
BACTERIA #/AREA URNS AUTO: NORMAL /HPF
BILIRUB UR QL STRIP.AUTO: ABNORMAL MG/DL
BILIRUBIN DIRECT+TOT PNL SERPL-MCNC: 0.6 MG/DL
BUN SERPL-MCNC: 16.1 MG/DL (ref 9.8–20.1)
CALCIUM SERPL-MCNC: 9.9 MG/DL (ref 8.4–10.2)
CHLORIDE SERPL-SCNC: 108 MMOL/L (ref 98–107)
CHOLEST SERPL-MCNC: 278 MG/DL
CHOLEST/HDLC SERPL: 5 {RATIO} (ref 0–5)
CO2 SERPL-SCNC: 26 MMOL/L (ref 23–31)
COLOR UR AUTO: YELLOW
CREAT SERPL-MCNC: 1.12 MG/DL (ref 0.55–1.02)
ERYTHROCYTE [DISTWIDTH] IN BLOOD BY AUTOMATED COUNT: 12.5 % (ref 11–14.5)
GFR SERPLBLD CREATININE-BSD FMLA CKD-EPI: 52 MLS/MIN/1.73/M2
GLOBULIN SER-MCNC: 2.5 GM/DL (ref 2.4–3.5)
GLUCOSE SERPL-MCNC: 89 MG/DL (ref 82–115)
GLUCOSE UR QL STRIP.AUTO: NEGATIVE MG/DL
HCT VFR BLD AUTO: 46.1 % (ref 37–47)
HDLC SERPL-MCNC: 51 MG/DL (ref 35–60)
HGB BLD-MCNC: 14.1 GM/DL (ref 12–16)
KETONES UR QL STRIP.AUTO: 15 MG/DL
LDLC SERPL CALC-MCNC: 195 MG/DL (ref 50–140)
LEUKOCYTE ESTERASE UR QL STRIP.AUTO: ABNORMAL UNIT/L
MCH RBC QN AUTO: 29.3 PG
MCHC RBC AUTO-ENTMCNC: 30.6 MG/DL (ref 33–36)
MCV RBC AUTO: 95.8 FL (ref 80–94)
NITRITE UR QL STRIP.AUTO: NEGATIVE
PH UR STRIP.AUTO: 5 [PH]
PLATELET # BLD AUTO: 457 X10(3)/MCL (ref 140–371)
PMV BLD AUTO: 9.6 FL (ref 9.4–12.4)
POTASSIUM SERPL-SCNC: 4.8 MMOL/L (ref 3.5–5.1)
PROT SERPL-MCNC: 6.7 GM/DL (ref 5.8–7.6)
PROT UR QL STRIP.AUTO: 30 MG/DL
RBC # BLD AUTO: 4.81 X10(6)/MCL (ref 4.2–5.4)
RBC #/AREA URNS AUTO: NORMAL /HPF
RBC UR QL AUTO: ABNORMAL UNIT/L
SODIUM SERPL-SCNC: 143 MMOL/L (ref 136–145)
SP GR UR STRIP.AUTO: 1.02
SQUAMOUS #/AREA URNS AUTO: NORMAL /HPF
TRIGL SERPL-MCNC: 162 MG/DL (ref 37–140)
TSH SERPL-ACNC: 6.34 UIU/ML (ref 0.35–4.94)
UROBILINOGEN UR STRIP-ACNC: 0.2 MG/DL
VLDLC SERPL CALC-MCNC: 32 MG/DL
WBC # SPEC AUTO: 5 X10(3)/MCL (ref 4.5–11.5)
WBC #/AREA URNS AUTO: NORMAL /HPF

## 2023-01-03 PROCEDURE — 85027 COMPLETE CBC AUTOMATED: CPT

## 2023-01-03 PROCEDURE — 81003 URINALYSIS AUTO W/O SCOPE: CPT

## 2023-01-03 PROCEDURE — 36415 COLL VENOUS BLD VENIPUNCTURE: CPT

## 2023-01-03 PROCEDURE — 80053 COMPREHEN METABOLIC PANEL: CPT

## 2023-01-03 PROCEDURE — 80061 LIPID PANEL: CPT

## 2023-01-03 PROCEDURE — 84443 ASSAY THYROID STIM HORMONE: CPT

## 2023-05-30 DIAGNOSIS — T45.1X5A PERIPHERAL NEUROPATHY DUE TO CHEMOTHERAPY: ICD-10-CM

## 2023-05-30 DIAGNOSIS — G62.0 PERIPHERAL NEUROPATHY DUE TO CHEMOTHERAPY: ICD-10-CM

## 2023-05-30 RX ORDER — GABAPENTIN 300 MG/1
300 CAPSULE ORAL 2 TIMES DAILY
Qty: 60 CAPSULE | Refills: 5 | Status: SHIPPED | OUTPATIENT
Start: 2023-05-30 | End: 2023-12-21 | Stop reason: SDUPTHER

## 2023-06-19 ENCOUNTER — LAB VISIT (OUTPATIENT)
Dept: LAB | Facility: HOSPITAL | Age: 73
End: 2023-06-19
Attending: FAMILY MEDICINE
Payer: MEDICARE

## 2023-06-19 DIAGNOSIS — I10 ESSENTIAL HYPERTENSION, MALIGNANT: ICD-10-CM

## 2023-06-19 DIAGNOSIS — E78.5 HYPERLIPIDEMIA, UNSPECIFIED HYPERLIPIDEMIA TYPE: Primary | ICD-10-CM

## 2023-06-19 DIAGNOSIS — E05.00 TOXIC DIFFUSE GOITER WITH PRETIBIAL MYXEDEMA: ICD-10-CM

## 2023-06-19 DIAGNOSIS — E03.8 TOXIC DIFFUSE GOITER WITH PRETIBIAL MYXEDEMA: ICD-10-CM

## 2023-06-19 LAB
ALBUMIN SERPL-MCNC: 4.2 G/DL (ref 3.4–4.8)
ALP SERPL-CCNC: 62 UNIT/L (ref 40–150)
ALT SERPL-CCNC: 20 UNIT/L (ref 0–55)
AST SERPL-CCNC: 16 UNIT/L (ref 5–34)
BILIRUBIN DIRECT+TOT PNL SERPL-MCNC: 0.2 MG/DL (ref 0–?)
BILIRUBIN DIRECT+TOT PNL SERPL-MCNC: 0.4 MG/DL (ref 0–0.8)
BILIRUBIN DIRECT+TOT PNL SERPL-MCNC: 0.6 MG/DL
CHOLEST SERPL-MCNC: 156 MG/DL
CHOLEST/HDLC SERPL: 4 {RATIO} (ref 0–5)
FT4I SERPL CALC-MCNC: 3.16 (ref 2.6–3.6)
HDLC SERPL-MCNC: 42 MG/DL (ref 35–60)
LDLC SERPL CALC-MCNC: 71 MG/DL (ref 50–140)
PROT SERPL-MCNC: 6.5 GM/DL (ref 5.8–7.6)
T3RU NFR SERPL: 41.79 % (ref 31–39)
T4 SERPL-MCNC: 7.55 UG/DL (ref 4.87–11.72)
TRIGL SERPL-MCNC: 216 MG/DL (ref 37–140)
TSH SERPL-ACNC: 3.29 UIU/ML (ref 0.35–4.94)
VLDLC SERPL CALC-MCNC: 43 MG/DL

## 2023-06-19 PROCEDURE — 36415 COLL VENOUS BLD VENIPUNCTURE: CPT

## 2023-06-19 PROCEDURE — 80061 LIPID PANEL: CPT

## 2023-06-19 PROCEDURE — 84443 ASSAY THYROID STIM HORMONE: CPT

## 2023-06-19 PROCEDURE — 80076 HEPATIC FUNCTION PANEL: CPT

## 2023-06-19 PROCEDURE — 84436 ASSAY OF TOTAL THYROXINE: CPT

## 2023-06-23 ENCOUNTER — HOSPITAL ENCOUNTER (OUTPATIENT)
Dept: RADIOLOGY | Facility: HOSPITAL | Age: 73
Discharge: HOME | End: 2023-06-23
Attending: FAMILY MEDICINE
Payer: MEDICARE

## 2023-06-23 DIAGNOSIS — Z78.0 POST-MENOPAUSE: ICD-10-CM

## 2023-06-23 PROCEDURE — 77080 DXA BONE DENSITY AXIAL: CPT | Mod: TC

## 2023-11-10 ENCOUNTER — LAB VISIT (OUTPATIENT)
Dept: LAB | Facility: HOSPITAL | Age: 73
End: 2023-11-10
Attending: INTERNAL MEDICINE
Payer: MEDICARE

## 2023-11-10 DIAGNOSIS — Z17.1 MALIGNANT NEOPLASM OF UPPER-OUTER QUADRANT OF RIGHT BREAST IN FEMALE, ESTROGEN RECEPTOR NEGATIVE: ICD-10-CM

## 2023-11-10 DIAGNOSIS — C50.411 MALIGNANT NEOPLASM OF UPPER-OUTER QUADRANT OF RIGHT BREAST IN FEMALE, ESTROGEN RECEPTOR NEGATIVE: ICD-10-CM

## 2023-11-10 LAB
ALBUMIN SERPL-MCNC: 4.4 G/DL (ref 3.4–4.8)
ALBUMIN/GLOB SERPL: 1.7 RATIO (ref 1.1–2)
ALP SERPL-CCNC: 62 UNIT/L (ref 40–150)
ALT SERPL-CCNC: 9 UNIT/L (ref 0–55)
AST SERPL-CCNC: 11 UNIT/L (ref 5–34)
BASOPHILS # BLD AUTO: 0.03 X10(3)/MCL
BASOPHILS NFR BLD AUTO: 0.4 %
BILIRUB SERPL-MCNC: 0.7 MG/DL
BUN SERPL-MCNC: 18 MG/DL (ref 9.8–20.1)
CALCIUM SERPL-MCNC: 9.9 MG/DL (ref 8.4–10.2)
CHLORIDE SERPL-SCNC: 106 MMOL/L (ref 98–107)
CO2 SERPL-SCNC: 26 MMOL/L (ref 23–31)
CREAT SERPL-MCNC: 1.28 MG/DL (ref 0.55–1.02)
EOSINOPHIL # BLD AUTO: 0.23 X10(3)/MCL (ref 0–0.9)
EOSINOPHIL NFR BLD AUTO: 3.3 %
ERYTHROCYTE [DISTWIDTH] IN BLOOD BY AUTOMATED COUNT: 11.8 % (ref 11.5–17)
GFR SERPLBLD CREATININE-BSD FMLA CKD-EPI: 44 MLS/MIN/1.73/M2
GLOBULIN SER-MCNC: 2.6 GM/DL (ref 2.4–3.5)
GLUCOSE SERPL-MCNC: 95 MG/DL (ref 82–115)
HCT VFR BLD AUTO: 41.9 % (ref 37–47)
HGB BLD-MCNC: 13 G/DL (ref 12–16)
IMM GRANULOCYTES # BLD AUTO: 0.01 X10(3)/MCL (ref 0–0.04)
IMM GRANULOCYTES NFR BLD AUTO: 0.1 %
LYMPHOCYTES # BLD AUTO: 2.98 X10(3)/MCL (ref 0.6–4.6)
LYMPHOCYTES NFR BLD AUTO: 42.3 %
MCH RBC QN AUTO: 30.4 PG (ref 27–31)
MCHC RBC AUTO-ENTMCNC: 31 G/DL (ref 33–36)
MCV RBC AUTO: 98.1 FL (ref 80–94)
MONOCYTES # BLD AUTO: 0.49 X10(3)/MCL (ref 0.1–1.3)
MONOCYTES NFR BLD AUTO: 7 %
NEUTROPHILS # BLD AUTO: 3.31 X10(3)/MCL (ref 2.1–9.2)
NEUTROPHILS NFR BLD AUTO: 46.9 %
PLATELET # BLD AUTO: 438 X10(3)/MCL (ref 130–400)
PMV BLD AUTO: 9.9 FL (ref 7.4–10.4)
POTASSIUM SERPL-SCNC: 4.9 MMOL/L (ref 3.5–5.1)
PROT SERPL-MCNC: 7 GM/DL (ref 5.8–7.6)
RBC # BLD AUTO: 4.27 X10(6)/MCL (ref 4.2–5.4)
SODIUM SERPL-SCNC: 142 MMOL/L (ref 136–145)
WBC # SPEC AUTO: 7.05 X10(3)/MCL (ref 4.5–11.5)

## 2023-11-10 PROCEDURE — 82378 CARCINOEMBRYONIC ANTIGEN: CPT

## 2023-11-10 PROCEDURE — 80053 COMPREHEN METABOLIC PANEL: CPT

## 2023-11-10 PROCEDURE — 86300 IMMUNOASSAY TUMOR CA 15-3: CPT

## 2023-11-10 PROCEDURE — 36415 COLL VENOUS BLD VENIPUNCTURE: CPT

## 2023-11-10 PROCEDURE — 85025 COMPLETE CBC W/AUTO DIFF WBC: CPT

## 2023-11-11 LAB — CEA SERPL-MCNC: 2.02 NG/ML (ref 0–3)

## 2023-11-13 LAB — CANCER AG27-29 SERPL-ACNC: 18.7 U/ML

## 2023-11-16 NOTE — PROGRESS NOTES
Subjective:       Patient ID: Elida Pate is a 73 y.o. female.    Chief Complaint:  I am doing okay    Diagnosis: Stage IIA right breast cancer 8/12 (T2 N0 M0), 2.5 cm, grade III, ER/IN negative, HER-2 +,                          Oncotype RS 75                    S/p bilateral mastectomies    Treatment History  [facility] protocol BIG-4-11: TCH + study drug x 6 ---> maintenance Herceptin + study drug x 1 yr    Current Treatment: Observation    Clinical History:  Patient initially presented 2/12 with a palpable mass in the right breast. History of multiple previous breast biopsies which were negative. The mass persisted and a subsequent mammogram was abnormal. Biopsy was positive for infiltrating ductal carcinoma. She underwent right modified radical mastectomy 8/17/12. Final pathology showed a 2.5 cm grade 3 ductal carcinoma with clear margins and 10 negative lymph nodes. There was no evidence of lymphovascular invasion. Estrogen receptors +2%, progesterone receptors <1% and HER-2/kenyetta was overexpressed by FISH. Ki-67 was high at 64%. Oncotype recurrence score 75. Estrogen and progesterone receptors were negative on Oncotype testing. Adjuvant chemotherapy was recommended with a trastuzamab-based regimen. She was initially evaluated by Dr. Christensen and enrolled on protocol to receive carboplatin, Taxotere and Herceptin +/- Perjeta. I assumed her care just prior to her sixth cycle of chemotherapy at her request. She had significant difficulty tolerating her adjuvant therapy. She had significant nausea, myalgias, fatigue, diarrhea and lower extremity edema. Taxotere was discontinued and she was changed to Taxol beginning with her third cycle which was tolerated marginally better. She developed grade 2 neuropathy associated with Taxol. She completed 1 year of maintenance therapy with Herceptin in combination with the study drug 9/27/13. Echocardiogram at the completion of therapy 9/20/13 showed preserved left ventricular  systolic function with an ejection fraction of 60%.    She underwent resection of an incarcerated right inguinal hernia 10/10/13. She underwent prophylactic left mastectomy and placement of bilateral tissue expanders 11/25/13. Left breast showed no significant pathology. She had to undergo additional surgery 12/27/13 due to recurrence of her right inguinal hernia. Her Mediport was removed.  She underwent placement of bilateral breast implants 12/29/14.  Follow-up echocardiogram 2/7/15 showed normal left ventricular function with an ejection fraction of 60%.  She has done well without evidence of disease recurrence.    Interval History  She returns to clinic today by herself for an annual surveillance visit.  She is nearly 12 years out from her initial diagnosis.  She continues to do well with no signs or symptoms suspicious for disease recurrence.  She reports no changes on her self examination following bilateral mastectomies and reconstruction.  She has not had any significant illnesses or problems within the past year.  No medication changes, no surgeries.  Laboratory testing for this visit showed no significant abnormalities.  Bone density exam 6/23/23 showed normal bone density of the lumbar spine and osteopenia of the left femoral neck with a T-score of-1.8.      Review of Systems   Constitutional:  Negative for appetite change, fatigue, fever and unexpected weight change.   HENT:  Negative for mouth sores, sore throat and trouble swallowing.    Eyes: Negative.    Respiratory:  Negative for cough and shortness of breath.    Cardiovascular:  Negative for chest pain, palpitations and leg swelling.   Gastrointestinal:  Negative for abdominal distention, abdominal pain, constipation, diarrhea, nausea and vomiting.   Genitourinary:  Negative for dysuria, frequency and urgency.   Musculoskeletal:  Positive for arthralgias and back pain (Chronic, stable).   Integumentary:  Negative for pallor and rash.  "  Neurological:  Positive for numbness (Feet > hands). Negative for dizziness, weakness and headaches.   Hematological:  Negative for adenopathy. Does not bruise/bleed easily.   Psychiatric/Behavioral: Negative.         PMHx:  HTN, athritis, neuropathy  PSHx:  Hysterectomy, right mastectomy and reconstruction, MediPort placement and removal, right inguinal hernia repair, S1 fusion  SH:  Lifetime nonsmoker, no significant alcohol use.  Lives in [place] with her , works as an  at .  FH:  Her mother had lung cancer, father had a metastatic squamous cell carcinoma of the leg.  A paternal aunt had breast cancer.    Objective:        /71 (BP Location: Left arm)   Pulse 81   Temp 98.3 °F (36.8 °C) (Oral)   Resp 20   Ht 5' 7" (1.702 m)   Wt 82 kg (180 lb 12.8 oz)   BMI 28.32 kg/m²    Physical Exam  Constitutional:       Comments: Elderly, well-developed white female in NAD   HENT:      Head: Normocephalic.      Mouth/Throat:      Mouth: Mucous membranes are moist.      Pharynx: Oropharynx is clear. No posterior oropharyngeal erythema.   Eyes:      Extraocular Movements: Extraocular movements intact.      Conjunctiva/sclera: Conjunctivae normal.      Pupils: Pupils are equal, round, and reactive to light.   Cardiovascular:      Rate and Rhythm: Normal rate and regular rhythm.      Heart sounds: No murmur heard.  Pulmonary:      Comments: Lungs clear to auscultation  Chest:      Comments: Bilateral mastectomies with implant reconstruction.  No suspicious masses, skin changes or axillary nodes bilaterally.  Abdominal:      General: Bowel sounds are normal. There is no distension.      Palpations: Abdomen is soft. There is no mass.      Tenderness: There is no abdominal tenderness.   Musculoskeletal:         General: No swelling or tenderness. Normal range of motion.      Cervical back: Neck supple. No tenderness.   Lymphadenopathy:      Cervical: No cervical adenopathy.      " Upper Body:      Right upper body: No supraclavicular or axillary adenopathy.      Left upper body: No supraclavicular or axillary adenopathy.      Comments: Well-healed right inguinal incision.   Skin:     General: Skin is warm and dry.      Findings: No rash.   Neurological:      Mental Status: She is alert and oriented to person, place, and time. Mental status is at baseline.      Cranial Nerves: No cranial nerve deficit.      Sensory: Sensory deficit present.       ECOG SCORE    0 - Fully active-able to carry on all pre-disease performance without restriction        LABORATORY  No results found for this or any previous visit (from the past 168 hour(s)).     Laboratory from 11/10/23 reviewed and unremarkable      Assessment:   Stage IIA breast cancer - MOLLY   Chronic neuropathy involving lower extremities - stable      Plan:   Patient continues to do well with no clinical or laboratory findings suspicious for disease recurrence.  She has no suspicious findings on her clinical breast exam.  RTC in 1 year for a follow-up visit and ongoing surveillance.      MARCOS DUKE MD    Other Physicians  Dr. Paco Schmidt

## 2023-11-21 ENCOUNTER — OFFICE VISIT (OUTPATIENT)
Dept: HEMATOLOGY/ONCOLOGY | Facility: CLINIC | Age: 73
End: 2023-11-21
Payer: MEDICARE

## 2023-11-21 VITALS
RESPIRATION RATE: 20 BRPM | BODY MASS INDEX: 28.38 KG/M2 | WEIGHT: 180.81 LBS | SYSTOLIC BLOOD PRESSURE: 134 MMHG | HEART RATE: 81 BPM | HEIGHT: 67 IN | DIASTOLIC BLOOD PRESSURE: 71 MMHG | TEMPERATURE: 98 F

## 2023-11-21 DIAGNOSIS — Z85.3 PERSONAL HISTORY OF BREAST CANCER: Primary | ICD-10-CM

## 2023-11-21 PROCEDURE — 99999 PR PBB SHADOW E&M-EST. PATIENT-LVL III: ICD-10-PCS | Mod: PBBFAC,,, | Performed by: INTERNAL MEDICINE

## 2023-11-21 PROCEDURE — 99214 PR OFFICE/OUTPT VISIT, EST, LEVL IV, 30-39 MIN: ICD-10-PCS | Mod: S$PBB,,, | Performed by: INTERNAL MEDICINE

## 2023-11-21 PROCEDURE — 99214 OFFICE O/P EST MOD 30 MIN: CPT | Mod: S$PBB,,, | Performed by: INTERNAL MEDICINE

## 2023-11-21 PROCEDURE — 99999 PR PBB SHADOW E&M-EST. PATIENT-LVL III: CPT | Mod: PBBFAC,,, | Performed by: INTERNAL MEDICINE

## 2023-11-21 PROCEDURE — 99213 OFFICE O/P EST LOW 20 MIN: CPT | Mod: PBBFAC | Performed by: INTERNAL MEDICINE

## 2023-11-21 RX ORDER — ROSUVASTATIN CALCIUM 20 MG/1
20 TABLET, COATED ORAL DAILY
COMMUNITY
Start: 2023-09-09

## 2023-12-21 DIAGNOSIS — T45.1X5A PERIPHERAL NEUROPATHY DUE TO CHEMOTHERAPY: ICD-10-CM

## 2023-12-21 DIAGNOSIS — G62.0 PERIPHERAL NEUROPATHY DUE TO CHEMOTHERAPY: ICD-10-CM

## 2023-12-21 RX ORDER — GABAPENTIN 300 MG/1
300 CAPSULE ORAL 2 TIMES DAILY
Qty: 60 CAPSULE | Refills: 5 | Status: SHIPPED | OUTPATIENT
Start: 2023-12-21

## 2024-05-08 ENCOUNTER — LAB VISIT (OUTPATIENT)
Dept: LAB | Facility: HOSPITAL | Age: 74
End: 2024-05-08
Attending: FAMILY MEDICINE
Payer: MEDICARE

## 2024-05-08 DIAGNOSIS — M51.36 DEGENERATION OF LUMBAR INTERVERTEBRAL DISC: ICD-10-CM

## 2024-05-08 DIAGNOSIS — I51.9 MYXEDEMA HEART DISEASE: ICD-10-CM

## 2024-05-08 DIAGNOSIS — E78.5 HYPERLIPIDEMIA, UNSPECIFIED HYPERLIPIDEMIA TYPE: ICD-10-CM

## 2024-05-08 DIAGNOSIS — E03.9 MYXEDEMA HEART DISEASE: ICD-10-CM

## 2024-05-08 DIAGNOSIS — I10 ESSENTIAL HYPERTENSION, MALIGNANT: Primary | ICD-10-CM

## 2024-05-08 LAB
ALBUMIN SERPL-MCNC: 3.9 G/DL (ref 3.4–4.8)
ALBUMIN/GLOB SERPL: 1.4 RATIO (ref 1.1–2)
ALP SERPL-CCNC: 56 UNIT/L (ref 40–150)
ALT SERPL-CCNC: 8 UNIT/L (ref 0–55)
AST SERPL-CCNC: 10 UNIT/L (ref 5–34)
BILIRUB SERPL-MCNC: 0.6 MG/DL
BUN SERPL-MCNC: 17.9 MG/DL (ref 9.8–20.1)
CALCIUM SERPL-MCNC: 9.6 MG/DL (ref 8.4–10.2)
CHLORIDE SERPL-SCNC: 110 MMOL/L (ref 98–107)
CHOLEST SERPL-MCNC: 182 MG/DL
CHOLEST/HDLC SERPL: 4 {RATIO} (ref 0–5)
CO2 SERPL-SCNC: 24 MMOL/L (ref 23–31)
CREAT SERPL-MCNC: 1.27 MG/DL (ref 0.55–1.02)
DEPRECATED CALCIDIOL+CALCIFEROL SERPL-MC: 30 NG/ML (ref 30–80)
ERYTHROCYTE [DISTWIDTH] IN BLOOD BY AUTOMATED COUNT: 11.9 % (ref 11.5–17)
FT4I SERPL CALC-MCNC: 2.2 (ref 2.6–3.6)
GFR SERPLBLD CREATININE-BSD FMLA CKD-EPI: 44 ML/MIN/1.73/M2
GLOBULIN SER-MCNC: 2.8 GM/DL (ref 2.4–3.5)
GLUCOSE SERPL-MCNC: 83 MG/DL (ref 82–115)
HCT VFR BLD AUTO: 41.3 % (ref 37–47)
HDLC SERPL-MCNC: 41 MG/DL (ref 35–60)
HGB BLD-MCNC: 13.4 G/DL (ref 12–16)
LDLC SERPL CALC-MCNC: 118 MG/DL (ref 50–140)
MCH RBC QN AUTO: 30.7 PG (ref 27–31)
MCHC RBC AUTO-ENTMCNC: 32.4 G/DL (ref 33–36)
MCV RBC AUTO: 94.7 FL (ref 80–94)
PLATELET # BLD AUTO: 433 X10(3)/MCL (ref 130–400)
PMV BLD AUTO: 9.6 FL (ref 7.4–10.4)
POTASSIUM SERPL-SCNC: 4.3 MMOL/L (ref 3.5–5.1)
PROT SERPL-MCNC: 6.7 GM/DL (ref 5.8–7.6)
RBC # BLD AUTO: 4.36 X10(6)/MCL (ref 4.2–5.4)
SODIUM SERPL-SCNC: 141 MMOL/L (ref 136–145)
T3RU NFR SERPL: 36.95 % (ref 31–39)
T4 SERPL-MCNC: 5.96 UG/DL (ref 4.87–11.72)
TRIGL SERPL-MCNC: 117 MG/DL (ref 37–140)
TSH SERPL-ACNC: 3.46 UIU/ML (ref 0.35–4.94)
VLDLC SERPL CALC-MCNC: 23 MG/DL
WBC # SPEC AUTO: 5.57 X10(3)/MCL (ref 4.5–11.5)

## 2024-05-08 PROCEDURE — 36415 COLL VENOUS BLD VENIPUNCTURE: CPT

## 2024-05-08 PROCEDURE — 80053 COMPREHEN METABOLIC PANEL: CPT

## 2024-05-08 PROCEDURE — 84443 ASSAY THYROID STIM HORMONE: CPT

## 2024-05-08 PROCEDURE — 82306 VITAMIN D 25 HYDROXY: CPT

## 2024-05-08 PROCEDURE — 80061 LIPID PANEL: CPT

## 2024-05-08 PROCEDURE — 84436 ASSAY OF TOTAL THYROXINE: CPT

## 2024-05-08 PROCEDURE — 85027 COMPLETE CBC AUTOMATED: CPT

## 2024-06-26 DIAGNOSIS — G62.0 PERIPHERAL NEUROPATHY DUE TO CHEMOTHERAPY: ICD-10-CM

## 2024-06-26 DIAGNOSIS — T45.1X5A PERIPHERAL NEUROPATHY DUE TO CHEMOTHERAPY: ICD-10-CM

## 2024-06-26 RX ORDER — GABAPENTIN 300 MG/1
300 CAPSULE ORAL 2 TIMES DAILY
Qty: 60 CAPSULE | Refills: 5 | Status: SHIPPED | OUTPATIENT
Start: 2024-06-26

## 2024-07-10 DIAGNOSIS — G62.0 PERIPHERAL NEUROPATHY DUE TO CHEMOTHERAPY: ICD-10-CM

## 2024-07-10 DIAGNOSIS — T45.1X5A PERIPHERAL NEUROPATHY DUE TO CHEMOTHERAPY: ICD-10-CM

## 2024-07-10 RX ORDER — GABAPENTIN 300 MG/1
300 CAPSULE ORAL 2 TIMES DAILY
Qty: 60 CAPSULE | Refills: 5 | Status: SHIPPED | OUTPATIENT
Start: 2024-07-10

## 2024-07-25 ENCOUNTER — TELEPHONE (OUTPATIENT)
Dept: HEMATOLOGY/ONCOLOGY | Facility: CLINIC | Age: 74
End: 2024-07-25
Payer: MEDICARE

## 2024-07-25 NOTE — TELEPHONE ENCOUNTER
Patient called to report left axillary tenderness worsening over about the last 2- 3 weeks. She is very anxious and concerned about this. She will come in tomorrow at 1030 for exam.

## 2024-07-26 ENCOUNTER — HOSPITAL ENCOUNTER (OUTPATIENT)
Dept: RADIOLOGY | Facility: HOSPITAL | Age: 74
Discharge: HOME | End: 2024-07-26
Attending: NURSE PRACTITIONER
Payer: MEDICARE

## 2024-07-26 ENCOUNTER — OFFICE VISIT (OUTPATIENT)
Dept: HEMATOLOGY/ONCOLOGY | Facility: CLINIC | Age: 74
End: 2024-07-26
Payer: MEDICARE

## 2024-07-26 VITALS
HEART RATE: 76 BPM | DIASTOLIC BLOOD PRESSURE: 75 MMHG | OXYGEN SATURATION: 98 % | SYSTOLIC BLOOD PRESSURE: 147 MMHG | WEIGHT: 187.38 LBS | BODY MASS INDEX: 28.4 KG/M2 | HEIGHT: 68 IN | RESPIRATION RATE: 15 BRPM

## 2024-07-26 DIAGNOSIS — M79.18 MUSCULOSKELETAL PAIN: ICD-10-CM

## 2024-07-26 DIAGNOSIS — M79.18 MUSCULOSKELETAL PAIN: Primary | ICD-10-CM

## 2024-07-26 PROCEDURE — 99999 PR PBB SHADOW E&M-EST. PATIENT-LVL III: CPT | Mod: PBBFAC,,, | Performed by: NURSE PRACTITIONER

## 2024-07-26 PROCEDURE — 73030 X-RAY EXAM OF SHOULDER: CPT | Mod: TC,LT

## 2024-07-26 PROCEDURE — 71101 X-RAY EXAM UNILAT RIBS/CHEST: CPT | Mod: TC,LT

## 2024-07-26 PROCEDURE — 99213 OFFICE O/P EST LOW 20 MIN: CPT | Mod: PBBFAC,25 | Performed by: NURSE PRACTITIONER

## 2024-07-26 NOTE — PROGRESS NOTES
Subjective:       Patient ID: Elida Pate is a 74 y.o. female.    Chief Complaint:  Left shoulder and arm pain    Diagnosis: Stage IIA right breast cancer 8/12 (T2 N0 M0), 2.5 cm, grade III, ER/[place] negative, HER-2 +,                          Oncotype RS 75                    S/p bilateral mastectomies    Treatment History  [facility] protocol BIG-4-11: TCH + study drug x 6 ---> maintenance Herceptin + study drug x 1 yr    Current Treatment: Observation    Clinical History:  Patient initially presented 2/12 with a palpable mass in the right breast. History of multiple previous breast biopsies which were negative. The mass persisted and a subsequent mammogram was abnormal. Biopsy was positive for infiltrating ductal carcinoma. She underwent right modified radical mastectomy 8/17/12. Final pathology showed a 2.5 cm grade 3 ductal carcinoma with clear margins and 10 negative lymph nodes. There was no evidence of lymphovascular invasion. Estrogen receptors +2%, progesterone receptors <1% and HER-2/kenyetta was overexpressed by FISH. Ki-67 was high at 64%. Oncotype recurrence score 75. Estrogen and progesterone receptors were negative on Oncotype testing. Adjuvant chemotherapy was recommended with a trastuzamab-based regimen. She was initially evaluated by Dr. Christensen and enrolled on protocol to receive carboplatin, Taxotere and Herceptin +/- Perjeta. I assumed her care just prior to her sixth cycle of chemotherapy at her request. She had significant difficulty tolerating her adjuvant therapy. She had significant nausea, myalgias, fatigue, diarrhea and lower extremity edema. Taxotere was discontinued and she was changed to Taxol beginning with her third cycle which was tolerated marginally better. She developed grade 2 neuropathy associated with Taxol. She completed 1 year of maintenance therapy with Herceptin in combination with the study drug 9/27/13. Echocardiogram at the completion of therapy 9/20/13 showed preserved left  ventricular systolic function with an ejection fraction of 60%.    She underwent resection of an incarcerated right inguinal hernia 10/10/13. She underwent prophylactic left mastectomy and placement of bilateral tissue expanders 11/25/13. Left breast showed no significant pathology. She had to undergo additional surgery 12/27/13 due to recurrence of her right inguinal hernia. Her Mediport was removed.  She underwent placement of bilateral breast implants 12/29/14.  Follow-up echocardiogram 2/7/15 showed normal left ventricular function with an ejection fraction of 60%.  She has done well without evidence of disease recurrence.    Bone density exam 6/23/23 showed normal bone density of the lumbar spine and osteopenia of the left femoral neck with a T-score of-1.8.    Interval History  Mrs. Pate is here today by herself for an unscheduled visit with complaints of a 3 week history of left lateral chest, upper arm and shoulder pain.  She is very active working in her yard, etc.  She denies any fall or specific injury.  She has limited ROM of the left shoulder and the left lateral chest is tender to palpation.  There are no masses or skin changes.  Her pain responds to NSAIDS.    Review of Systems   Constitutional:  Negative for appetite change, fatigue, fever and unexpected weight change.   HENT:  Negative for mouth sores, sore throat and trouble swallowing.    Eyes: Negative.    Respiratory:  Negative for cough and shortness of breath.    Cardiovascular:  Negative for chest pain, palpitations and leg swelling.   Gastrointestinal:  Negative for abdominal distention, abdominal pain, constipation, diarrhea, nausea and vomiting.   Genitourinary:  Negative for dysuria, frequency and urgency.   Musculoskeletal:  Positive for arthralgias and back pain (Chronic, stable).        Left shoulder pain   Integumentary:  Negative for pallor and rash.   Neurological:  Positive for numbness (Feet > hands). Negative for dizziness,  "weakness and headaches.   Hematological:  Negative for adenopathy. Does not bruise/bleed easily.   Psychiatric/Behavioral: Negative.         PMHx:  HTN, athritis, neuropathy  PSHx:  Hysterectomy, right mastectomy and reconstruction, MediPort placement and removal, right inguinal hernia repair, S1 fusion  SH:  Lifetime nonsmoker, no significant alcohol use.  Lives in [place] with her , works as an  at .  FH:  Her mother had lung cancer, father had a metastatic squamous cell carcinoma of the leg.  A paternal aunt had breast cancer.    Objective:        BP (!) 147/75   Pulse 76   Resp 15   Ht 5' 8" (1.727 m)   Wt 85 kg (187 lb 6.4 oz)   SpO2 98%   BMI 28.49 kg/m²    Physical Exam  Constitutional:       Comments: Elderly, well-developed white female in NAD   HENT:      Head: Normocephalic.      Mouth/Throat:      Mouth: Mucous membranes are moist.      Pharynx: Oropharynx is clear. No posterior oropharyngeal erythema.   Eyes:      Extraocular Movements: Extraocular movements intact.      Conjunctiva/sclera: Conjunctivae normal.      Pupils: Pupils are equal, round, and reactive to light.   Cardiovascular:      Rate and Rhythm: Normal rate and regular rhythm.      Heart sounds: No murmur heard.  Pulmonary:      Comments: Lungs clear to auscultation  Chest:      Comments: Bilateral mastectomies with implant reconstruction.  No suspicious masses, skin changes or axillary nodes bilaterally.  Left lateral chest wall very tender to palpation.  Abdominal:      General: Bowel sounds are normal. There is no distension.      Palpations: Abdomen is soft. There is no mass.      Tenderness: There is no abdominal tenderness.   Musculoskeletal:         General: Tenderness present. No swelling.      Cervical back: Neck supple. No tenderness.      Comments: Decreased ROM LUE, no weakness   Lymphadenopathy:      Cervical: No cervical adenopathy.      Upper Body:      Right upper body: No " supraclavicular or axillary adenopathy.      Left upper body: No supraclavicular or axillary adenopathy.      Comments: Well-healed right inguinal incision.   Skin:     General: Skin is warm and dry.      Findings: No rash.   Neurological:      Mental Status: She is alert and oriented to person, place, and time. Mental status is at baseline.      Cranial Nerves: No cranial nerve deficit.      Sensory: Sensory deficit present.       ECOG SCORE    1 - Restricted in strenuous activity-ambulatory and able to carry out work of a light nature        LABORATORY  No new laboratory for review      Assessment:   Stage IIA breast cancer - MOLLY   Chronic neuropathy involving lower extremities - stable  LUE pain with decreased ROM    Plan:   Symptoms and physical exam are consistent with musculoskeletal pain and overuse.  Refer for plain films of the left shoulder, and CXR with left rib details.  Continue NSAIDs, rest.  Orthopedic referral if needed.  Keep follow-up for breast cancer surveillance.  All questions answered to the satisfaction of the patient.    MU ELDRIDGE, FNP-C  [facility]     Other Physicians  Dr. Paco Schmidt

## 2024-11-19 NOTE — PROGRESS NOTES
Subjective:       Patient ID: Elida Pate is a 74 y.o. female.    Chief Complaint:  Burning pain in the feet    Diagnosis: Stage IIA right breast cancer 8/12 (T2 N0 M0), 2.5 cm, GIII, ER/[place] negative, HER-2 +,                          Oncotype RS 75                    S/p bilateral mastectomies    Treatment History  [facility] protocol BIG-4-11: TCH + study drug x 6 ---> maintenance Herceptin + study drug x 1 yr    Current Treatment: Observation    Clinical History:  Patient initially presented 2/12 with a palpable mass in the right breast. History of multiple previous breast biopsies which were negative. The mass persisted and a subsequent mammogram was abnormal. Biopsy was positive for infiltrating ductal carcinoma. She underwent right modified radical mastectomy 8/17/12. Final pathology showed a 2.5 cm grade 3 ductal carcinoma with clear margins and 10 negative lymph nodes. There was no evidence of lymphovascular invasion. Estrogen receptors +2%, progesterone receptors <1% and HER-2/kenyetta was overexpressed by FISH. Ki-67 was high at 64%. Oncotype recurrence score 75. Estrogen and progesterone receptors were negative on Oncotype testing. Adjuvant chemotherapy was recommended with a trastuzamab-based regimen. She was initially evaluated by Dr. Christensen and enrolled on protocol to receive carboplatin, Taxotere and Herceptin +/- Perjeta. I assumed her care just prior to her sixth cycle of chemotherapy at her request. She had significant difficulty tolerating her adjuvant therapy. She had significant nausea, myalgias, fatigue, diarrhea and lower extremity edema. Taxotere was discontinued and she was changed to Taxol beginning with her third cycle which was tolerated marginally better. She developed grade 2 neuropathy associated with Taxol. She completed 1 year of maintenance therapy with Herceptin in combination with the study drug 9/27/13. Echocardiogram at the completion of therapy 9/20/13 showed preserved left  ventricular systolic function with an ejection fraction of 60%.    She underwent resection of an incarcerated right inguinal hernia 10/10/13. She underwent prophylactic left mastectomy and placement of bilateral tissue expanders 11/25/13. Left breast showed no significant pathology. She had to undergo additional surgery 12/27/13 due to recurrence of her right inguinal hernia. Her Mediport was removed.  She underwent placement of bilateral breast implants 12/29/14.  Follow-up echocardiogram 2/7/15 showed normal left ventricular function with an ejection fraction of 60%.  She has done well without evidence of disease recurrence.    Bone density exam 6/23/23 showed normal bone density of the lumbar spine and osteopenia of the left femoral neck with a T-score of-1.8.    Interval History  She returns to the office today for her scheduled annual surveillance appointment.  She was seen in July for an unscheduled visit complaining of left lateral chest wall and shoulder pain.  Plain films showed degenerative changes and no findings suspicious for metastatic disease.  She had been very active, working in the yard.  She was treated conservatively with improvement in her symptoms.  Her main complaint today is burning paresthesias involving her feet which are worse at night.  She has been taking gabapentin 600 mg at bedtime, but no doses during the day.  No significant neuropathy involving her hands.  She reports no changes on her self examination following bilateral mastectomies and reconstruction.  Laboratory testing for this visit showed no significant abnormalities.      Review of Systems   Constitutional:  Negative for appetite change, fatigue, fever and unexpected weight change.   HENT:  Negative for mouth sores, sore throat and trouble swallowing.    Eyes: Negative.    Respiratory:  Negative for cough and shortness of breath.    Cardiovascular:  Negative for chest pain, palpitations and leg swelling.   Gastrointestinal:   "Negative for abdominal distention, abdominal pain, constipation, diarrhea and nausea.   Genitourinary:  Negative for dysuria, flank pain and frequency.   Musculoskeletal:  Positive for arthralgias and back pain (Chronic, stable).   Integumentary:  Negative for pallor and rash.   Neurological:  Positive for numbness (Feet > hands). Negative for dizziness, weakness and headaches.   Hematological:  Negative for adenopathy. Does not bruise/bleed easily.   Psychiatric/Behavioral: Negative.         PMHx:  HTN, athritis, neuropathy  PSHx:  Hysterectomy, right mastectomy and reconstruction, MediPort placement and removal, right inguinal hernia repair, S1 fusion  SH:  Lifetime nonsmoker, no significant alcohol use.  Lives in [place] with her , works as an  at .  FH:  Her mother had lung cancer, father had a metastatic squamous cell carcinoma of the leg.  A paternal aunt had breast cancer.    Objective:        /80 (BP Location: Left arm, Patient Position: Sitting)   Pulse 68   Temp 98.4 °F (36.9 °C) (Oral)   Ht 5' 8" (1.727 m)   Wt 86.4 kg (190 lb 8 oz)   SpO2 100%   BMI 28.97 kg/m²    Physical Exam  Constitutional:       Comments: Elderly, well-developed white female in NAD   HENT:      Head: Normocephalic.      Mouth/Throat:      Mouth: Mucous membranes are moist.      Pharynx: Oropharynx is clear. No posterior oropharyngeal erythema.   Eyes:      Extraocular Movements: Extraocular movements intact.      Conjunctiva/sclera: Conjunctivae normal.      Pupils: Pupils are equal, round, and reactive to light.   Cardiovascular:      Rate and Rhythm: Normal rate and regular rhythm.      Heart sounds: No murmur heard.  Pulmonary:      Comments: Lungs clear to auscultation  Chest:      Comments: Bilateral mastectomies with implant reconstruction.  No suspicious masses, skin changes or axillary nodes bilaterally.  Left lateral chest wall tender to palpation.  Abdominal:      General: " Bowel sounds are normal. There is no distension.      Palpations: Abdomen is soft. There is no mass.      Tenderness: There is no abdominal tenderness.   Musculoskeletal:         General: Tenderness present. No swelling.      Cervical back: Neck supple. No tenderness.   Lymphadenopathy:      Cervical: No cervical adenopathy.      Upper Body:      Right upper body: No supraclavicular or axillary adenopathy.      Left upper body: No supraclavicular or axillary adenopathy.      Comments: Well-healed right inguinal incision.   Skin:     General: Skin is warm and dry.      Findings: No rash.   Neurological:      Mental Status: She is alert and oriented to person, place, and time. Mental status is at baseline.      Cranial Nerves: No cranial nerve deficit.      Sensory: Sensory deficit present.       ECOG SCORE    1 - Restricted in strenuous activity-ambulatory and able to carry out work of a light nature        LABORATORY  Laboratory from 11/29/24 reviewed.      Assessment:   Stage IIA breast cancer - MOLLY   Chronic neuropathy involving lower extremities       Plan:   Patient has no suspicious findings on her clinical exam or laboratory testing.  She will continue gabapentin 600 mg at bedtime and also take 300 mg at least once during the day to see if it helps her current neuropathy symptoms.  She will notify me if her symptoms worsen or fail to improve.  RTC in 1 year for a follow-up visit and clinical exam with repeat laboratory.      MARCOS DUKE MD    Other Physicians  Dr. Paco Schmidt

## 2024-11-29 ENCOUNTER — LAB VISIT (OUTPATIENT)
Dept: LAB | Facility: HOSPITAL | Age: 74
End: 2024-11-29
Attending: FAMILY MEDICINE
Payer: MEDICARE

## 2024-11-29 DIAGNOSIS — Z85.3 PERSONAL HISTORY OF BREAST CANCER: ICD-10-CM

## 2024-11-29 LAB
ALBUMIN SERPL-MCNC: 4.1 G/DL (ref 3.4–4.8)
ALBUMIN/GLOB SERPL: 1.5 RATIO (ref 1.1–2)
ALP SERPL-CCNC: 62 UNIT/L (ref 40–150)
ALT SERPL-CCNC: 9 UNIT/L (ref 0–55)
ANION GAP SERPL CALC-SCNC: 9 MEQ/L
AST SERPL-CCNC: 13 UNIT/L (ref 5–34)
BASOPHILS # BLD AUTO: 0.05 X10(3)/MCL
BASOPHILS NFR BLD AUTO: 0.9 %
BILIRUB SERPL-MCNC: 0.5 MG/DL
BUN SERPL-MCNC: 19.8 MG/DL (ref 9.8–20.1)
CALCIUM SERPL-MCNC: 9.6 MG/DL (ref 8.4–10.2)
CEA SERPL-MCNC: 2.09 NG/ML (ref 0–3)
CHLORIDE SERPL-SCNC: 110 MMOL/L (ref 98–107)
CO2 SERPL-SCNC: 24 MMOL/L (ref 23–31)
CREAT SERPL-MCNC: 1.22 MG/DL (ref 0.55–1.02)
CREAT/UREA NIT SERPL: 16
EOSINOPHIL # BLD AUTO: 0.26 X10(3)/MCL (ref 0–0.9)
EOSINOPHIL NFR BLD AUTO: 4.7 %
ERYTHROCYTE [DISTWIDTH] IN BLOOD BY AUTOMATED COUNT: 12 % (ref 11.5–17)
GFR SERPLBLD CREATININE-BSD FMLA CKD-EPI: 47 ML/MIN/1.73/M2
GLOBULIN SER-MCNC: 2.8 GM/DL (ref 2.4–3.5)
GLUCOSE SERPL-MCNC: 74 MG/DL (ref 82–115)
HCT VFR BLD AUTO: 42.5 % (ref 37–47)
HGB BLD-MCNC: 13.7 G/DL (ref 12–16)
IMM GRANULOCYTES # BLD AUTO: 0.01 X10(3)/MCL (ref 0–0.04)
IMM GRANULOCYTES NFR BLD AUTO: 0.2 %
LYMPHOCYTES # BLD AUTO: 2.75 X10(3)/MCL (ref 0.6–4.6)
LYMPHOCYTES NFR BLD AUTO: 49.5 %
MCH RBC QN AUTO: 30.9 PG (ref 27–31)
MCHC RBC AUTO-ENTMCNC: 32.2 G/DL (ref 33–36)
MCV RBC AUTO: 95.9 FL (ref 80–94)
MONOCYTES # BLD AUTO: 0.34 X10(3)/MCL (ref 0.1–1.3)
MONOCYTES NFR BLD AUTO: 6.1 %
NEUTROPHILS # BLD AUTO: 2.15 X10(3)/MCL (ref 2.1–9.2)
NEUTROPHILS NFR BLD AUTO: 38.6 %
PLATELET # BLD AUTO: 439 X10(3)/MCL (ref 130–400)
PMV BLD AUTO: 9.8 FL (ref 7.4–10.4)
POTASSIUM SERPL-SCNC: 4.8 MMOL/L (ref 3.5–5.1)
PROT SERPL-MCNC: 6.9 GM/DL (ref 5.8–7.6)
RBC # BLD AUTO: 4.43 X10(6)/MCL (ref 4.2–5.4)
SODIUM SERPL-SCNC: 143 MMOL/L (ref 136–145)
WBC # BLD AUTO: 5.56 X10(3)/MCL (ref 4.5–11.5)

## 2024-11-29 PROCEDURE — 80053 COMPREHEN METABOLIC PANEL: CPT

## 2024-11-29 PROCEDURE — 85025 COMPLETE CBC W/AUTO DIFF WBC: CPT

## 2024-11-29 PROCEDURE — 86300 IMMUNOASSAY TUMOR CA 15-3: CPT

## 2024-11-29 PROCEDURE — 82378 CARCINOEMBRYONIC ANTIGEN: CPT

## 2024-11-29 PROCEDURE — 36415 COLL VENOUS BLD VENIPUNCTURE: CPT

## 2024-12-02 ENCOUNTER — OFFICE VISIT (OUTPATIENT)
Dept: HEMATOLOGY/ONCOLOGY | Facility: CLINIC | Age: 74
End: 2024-12-02
Payer: MEDICARE

## 2024-12-02 VITALS
HEIGHT: 68 IN | OXYGEN SATURATION: 100 % | WEIGHT: 190.5 LBS | DIASTOLIC BLOOD PRESSURE: 80 MMHG | SYSTOLIC BLOOD PRESSURE: 130 MMHG | TEMPERATURE: 98 F | BODY MASS INDEX: 28.87 KG/M2 | HEART RATE: 68 BPM

## 2024-12-02 DIAGNOSIS — T45.1X5A PERIPHERAL NEUROPATHY DUE TO CHEMOTHERAPY: ICD-10-CM

## 2024-12-02 DIAGNOSIS — Z85.3 PERSONAL HISTORY OF BREAST CANCER: Primary | ICD-10-CM

## 2024-12-02 DIAGNOSIS — G62.0 PERIPHERAL NEUROPATHY DUE TO CHEMOTHERAPY: ICD-10-CM

## 2024-12-02 DIAGNOSIS — N95.9 MENOPAUSAL AND POSTMENOPAUSAL DISORDER: ICD-10-CM

## 2024-12-02 LAB — CANCER AG27-29 SERPL-ACNC: 25.4 U/ML

## 2024-12-02 PROCEDURE — 99214 OFFICE O/P EST MOD 30 MIN: CPT | Mod: S$PBB,,, | Performed by: INTERNAL MEDICINE

## 2024-12-02 PROCEDURE — 99999 PR PBB SHADOW E&M-EST. PATIENT-LVL III: CPT | Mod: PBBFAC,,, | Performed by: INTERNAL MEDICINE

## 2024-12-02 PROCEDURE — 99213 OFFICE O/P EST LOW 20 MIN: CPT | Mod: PBBFAC | Performed by: INTERNAL MEDICINE

## 2024-12-02 RX ORDER — GABAPENTIN 300 MG/1
300 CAPSULE ORAL 3 TIMES DAILY
Qty: 90 CAPSULE | Refills: 5 | Status: SHIPPED | OUTPATIENT
Start: 2024-12-02

## 2025-04-28 ENCOUNTER — HOSPITAL ENCOUNTER (OUTPATIENT)
Dept: RADIOLOGY | Facility: HOSPITAL | Age: 75
Discharge: HOME | End: 2025-04-28
Attending: FAMILY MEDICINE
Payer: MEDICARE

## 2025-04-28 DIAGNOSIS — M25.572 LEFT ANKLE PAIN, UNSPECIFIED CHRONICITY: ICD-10-CM

## 2025-04-28 PROCEDURE — 73721 MRI JNT OF LWR EXTRE W/O DYE: CPT | Mod: TC,LT

## 2025-05-05 DIAGNOSIS — M25.572 LEFT ANKLE PAIN: Primary | ICD-10-CM

## 2025-05-14 ENCOUNTER — OFFICE VISIT (OUTPATIENT)
Dept: ORTHOPEDICS | Facility: CLINIC | Age: 75
End: 2025-05-14
Payer: MEDICARE

## 2025-05-14 VITALS
DIASTOLIC BLOOD PRESSURE: 81 MMHG | HEIGHT: 68 IN | WEIGHT: 182 LBS | HEART RATE: 78 BPM | SYSTOLIC BLOOD PRESSURE: 162 MMHG | BODY MASS INDEX: 27.58 KG/M2

## 2025-05-14 DIAGNOSIS — S93.401A MODERATE RIGHT ANKLE SPRAIN, INITIAL ENCOUNTER: ICD-10-CM

## 2025-05-14 DIAGNOSIS — M76.821 POSTERIOR TIBIAL TENDON DYSFUNCTION, RIGHT: Primary | ICD-10-CM

## 2025-05-14 DIAGNOSIS — I87.2 VENOUS INSUFFICIENCY OF LEFT LEG: ICD-10-CM

## 2025-05-14 DIAGNOSIS — M25.572 LEFT ANKLE PAIN: ICD-10-CM

## 2025-05-14 PROCEDURE — 3077F SYST BP >= 140 MM HG: CPT | Mod: CPTII,,, | Performed by: ORTHOPAEDIC SURGERY

## 2025-05-14 PROCEDURE — 1101F PT FALLS ASSESS-DOCD LE1/YR: CPT | Mod: CPTII,,, | Performed by: ORTHOPAEDIC SURGERY

## 2025-05-14 PROCEDURE — 1159F MED LIST DOCD IN RCRD: CPT | Mod: CPTII,,, | Performed by: ORTHOPAEDIC SURGERY

## 2025-05-14 PROCEDURE — 3079F DIAST BP 80-89 MM HG: CPT | Mod: CPTII,,, | Performed by: ORTHOPAEDIC SURGERY

## 2025-05-14 PROCEDURE — 1160F RVW MEDS BY RX/DR IN RCRD: CPT | Mod: CPTII,,, | Performed by: ORTHOPAEDIC SURGERY

## 2025-05-14 PROCEDURE — 3288F FALL RISK ASSESSMENT DOCD: CPT | Mod: CPTII,,, | Performed by: ORTHOPAEDIC SURGERY

## 2025-05-14 PROCEDURE — 99204 OFFICE O/P NEW MOD 45 MIN: CPT | Mod: ,,, | Performed by: ORTHOPAEDIC SURGERY

## 2025-05-14 RX ORDER — MELOXICAM 15 MG/1
15 TABLET ORAL DAILY
Qty: 30 TABLET | Refills: 0 | Status: SHIPPED | OUTPATIENT
Start: 2025-05-14

## 2025-05-15 NOTE — PROGRESS NOTES
"Subjective:    CC: Pain and Results of the Right Ankle (MRI results from Dr. Schmidt. Pt states she had a red spot on L ankle that severely hurt 2 months ago. Had XR done and was on pain meds for 2 weeks that didn't help. Ankle continued to discolor and have bruising. Pt states it feels like it's getting better. Looks better in the morning but has discoloring once she starts moving. No numbness or tingling. Has burning pain and a lot of swelling. Has difficulty putting weight on it- starting to hurt on bottom of heel with full pressure. )       HPI:  Patient comes in today for her 1st clinic appointment.  Patient complains of left ankle pain, color and discoloration, and swelling.  She states it gets bad at the end of the day.  She states she has been hurting for the last couple of months.  She has pictures of her swelling, discoloration, she feels a point tenderness along the red dot area over her ankle.  She did have outside x-rays as well as an MRI here for review.    ROS: Refer to HPI for pertinent ROS. All other 12 point systems negative.    Objective:  Vitals:    05/14/25 0826   BP: (!) 162/81   BP Location: Right arm   Patient Position: Sitting   Pulse: 78   Weight: 82.6 kg (182 lb)   Height: 5' 8" (1.727 m)        Physical Exam:  Patient is a well-nourished developed female she is awake alert and oriented x3 she is in no apparent distress he is pleasant and cooperative.  Examination of the left lower extremity compartment soft and warm.  Skin is intact.  There is no signs or symptoms of DVT or infection.  She does have some chronic venous stasis changes.  Minimal edema.  She is nontender about the ATFL.  She does have some tenderness along the posterior tibial tendon, otherwise intact.  She has 25° of ankle motion stable to stressing negative anterior drawer, neurovascular intact distally.    Images:  Outside imaging were reviewed. Images Reviewed and discussed with patient.    Assessment:  1. Left posterior " tibial tendon insufficiency  2. Venous stasis left lower extremity  3 left ankle moderate ankle sprain      Plan:  At this time we discussed her physical exam and outside imaging.  She is nontender about her ATFL injury.  We have discussed her more symptomatic posterior tibial tendon tendonitis.  We have discussed an arch support, walking boot.  She is also tender while along the superficial veins, venous insufficiency with skin changes in discoloration.  We have discussed consultation with a vascular surgeon to help us with this as well.  We have discussed some anti-inflammatories with appropriate precautions, I would like see back in 4 weeks to see how she is progressing.  We will continue conservative treatment.    Follow UP: No follow-ups on file.

## 2025-07-29 DIAGNOSIS — T45.1X5A PERIPHERAL NEUROPATHY DUE TO CHEMOTHERAPY: ICD-10-CM

## 2025-07-29 DIAGNOSIS — G62.0 PERIPHERAL NEUROPATHY DUE TO CHEMOTHERAPY: ICD-10-CM

## 2025-07-29 RX ORDER — GABAPENTIN 300 MG/1
300 CAPSULE ORAL 3 TIMES DAILY
Qty: 90 CAPSULE | Refills: 5 | Status: SHIPPED | OUTPATIENT
Start: 2025-07-29